# Patient Record
Sex: FEMALE | Race: OTHER | NOT HISPANIC OR LATINO | URBAN - METROPOLITAN AREA
[De-identification: names, ages, dates, MRNs, and addresses within clinical notes are randomized per-mention and may not be internally consistent; named-entity substitution may affect disease eponyms.]

---

## 2018-01-20 ENCOUNTER — EMERGENCY (EMERGENCY)
Facility: HOSPITAL | Age: 73
LOS: 1 days | Discharge: HOME | End: 2018-01-20
Attending: EMERGENCY MEDICINE
Payer: MEDICARE

## 2018-01-20 VITALS
WEIGHT: 134.04 LBS | TEMPERATURE: 98 F | HEART RATE: 60 BPM | HEIGHT: 59 IN | SYSTOLIC BLOOD PRESSURE: 184 MMHG | DIASTOLIC BLOOD PRESSURE: 79 MMHG | RESPIRATION RATE: 16 BRPM | OXYGEN SATURATION: 100 %

## 2018-01-20 LAB
ACETONE SERPL-MCNC: NEGATIVE — SIGNIFICANT CHANGE UP
ALBUMIN SERPL ELPH-MCNC: 4.1 G/DL — SIGNIFICANT CHANGE UP (ref 3.5–5)
ALP SERPL-CCNC: 54 U/L — SIGNIFICANT CHANGE UP (ref 40–120)
ALT FLD-CCNC: 27 U/L DA — SIGNIFICANT CHANGE UP (ref 10–60)
ANION GAP SERPL CALC-SCNC: 7 MMOL/L — SIGNIFICANT CHANGE UP (ref 5–17)
APPEARANCE UR: CLEAR — SIGNIFICANT CHANGE UP
APTT BLD: 28.9 SEC — SIGNIFICANT CHANGE UP (ref 27.5–37.4)
AST SERPL-CCNC: 22 U/L — SIGNIFICANT CHANGE UP (ref 10–40)
BACTERIA # UR AUTO: ABNORMAL /HPF
BASOPHILS # BLD AUTO: 0.1 K/UL — SIGNIFICANT CHANGE UP (ref 0–0.2)
BASOPHILS NFR BLD AUTO: 1.2 % — SIGNIFICANT CHANGE UP (ref 0–2)
BILIRUB SERPL-MCNC: 0.3 MG/DL — SIGNIFICANT CHANGE UP (ref 0.2–1.2)
BILIRUB UR-MCNC: NEGATIVE — SIGNIFICANT CHANGE UP
BUN SERPL-MCNC: 15 MG/DL — SIGNIFICANT CHANGE UP (ref 7–18)
CALCIUM SERPL-MCNC: 9 MG/DL — SIGNIFICANT CHANGE UP (ref 8.4–10.5)
CHLORIDE SERPL-SCNC: 102 MMOL/L — SIGNIFICANT CHANGE UP (ref 96–108)
CK MB BLD-MCNC: 1.1 % — SIGNIFICANT CHANGE UP (ref 0–3.5)
CK MB CFR SERPL CALC: 1.1 NG/ML — SIGNIFICANT CHANGE UP (ref 0–3.6)
CK SERPL-CCNC: 101 U/L — SIGNIFICANT CHANGE UP (ref 21–215)
CO2 SERPL-SCNC: 26 MMOL/L — SIGNIFICANT CHANGE UP (ref 22–31)
COLOR SPEC: YELLOW — SIGNIFICANT CHANGE UP
CREAT SERPL-MCNC: 0.58 MG/DL — SIGNIFICANT CHANGE UP (ref 0.5–1.3)
DIFF PNL FLD: ABNORMAL
EOSINOPHIL # BLD AUTO: 0.2 K/UL — SIGNIFICANT CHANGE UP (ref 0–0.5)
EOSINOPHIL NFR BLD AUTO: 2.9 % — SIGNIFICANT CHANGE UP (ref 0–6)
GLUCOSE SERPL-MCNC: 105 MG/DL — HIGH (ref 70–99)
GLUCOSE UR QL: NEGATIVE — SIGNIFICANT CHANGE UP
HCT VFR BLD CALC: 36.8 % — SIGNIFICANT CHANGE UP (ref 34.5–45)
HGB BLD-MCNC: 12.2 G/DL — SIGNIFICANT CHANGE UP (ref 11.5–15.5)
INR BLD: 0.96 RATIO — SIGNIFICANT CHANGE UP (ref 0.88–1.16)
KETONES UR-MCNC: NEGATIVE — SIGNIFICANT CHANGE UP
LEUKOCYTE ESTERASE UR-ACNC: ABNORMAL
LIDOCAIN IGE QN: 190 U/L — SIGNIFICANT CHANGE UP (ref 73–393)
LYMPHOCYTES # BLD AUTO: 3.2 K/UL — SIGNIFICANT CHANGE UP (ref 1–3.3)
LYMPHOCYTES # BLD AUTO: 36.8 % — SIGNIFICANT CHANGE UP (ref 13–44)
MAGNESIUM SERPL-MCNC: 1.9 MG/DL — SIGNIFICANT CHANGE UP (ref 1.6–2.6)
MCHC RBC-ENTMCNC: 30 PG — SIGNIFICANT CHANGE UP (ref 27–34)
MCHC RBC-ENTMCNC: 33.2 GM/DL — SIGNIFICANT CHANGE UP (ref 32–36)
MCV RBC AUTO: 90.5 FL — SIGNIFICANT CHANGE UP (ref 80–100)
MONOCYTES # BLD AUTO: 0.5 K/UL — SIGNIFICANT CHANGE UP (ref 0–0.9)
MONOCYTES NFR BLD AUTO: 6.1 % — SIGNIFICANT CHANGE UP (ref 2–14)
NEUTROPHILS # BLD AUTO: 4.6 K/UL — SIGNIFICANT CHANGE UP (ref 1.8–7.4)
NEUTROPHILS NFR BLD AUTO: 53.1 % — SIGNIFICANT CHANGE UP (ref 43–77)
NITRITE UR-MCNC: NEGATIVE — SIGNIFICANT CHANGE UP
NT-PROBNP SERPL-SCNC: 90 PG/ML — SIGNIFICANT CHANGE UP (ref 0–125)
PH UR: 7 — SIGNIFICANT CHANGE UP (ref 5–8)
PLATELET # BLD AUTO: 227 K/UL — SIGNIFICANT CHANGE UP (ref 150–400)
POTASSIUM SERPL-MCNC: 4.4 MMOL/L — SIGNIFICANT CHANGE UP (ref 3.5–5.3)
POTASSIUM SERPL-SCNC: 4.4 MMOL/L — SIGNIFICANT CHANGE UP (ref 3.5–5.3)
PROT SERPL-MCNC: 8 G/DL — SIGNIFICANT CHANGE UP (ref 6–8.3)
PROT UR-MCNC: NEGATIVE — SIGNIFICANT CHANGE UP
PROTHROM AB SERPL-ACNC: 10.4 SEC — SIGNIFICANT CHANGE UP (ref 9.8–12.7)
RBC # BLD: 4.07 M/UL — SIGNIFICANT CHANGE UP (ref 3.8–5.2)
RBC # FLD: 12 % — SIGNIFICANT CHANGE UP (ref 10.3–14.5)
RBC CASTS # UR COMP ASSIST: ABNORMAL /HPF (ref 0–2)
SODIUM SERPL-SCNC: 135 MMOL/L — SIGNIFICANT CHANGE UP (ref 135–145)
SP GR SPEC: 1.01 — SIGNIFICANT CHANGE UP (ref 1.01–1.02)
TROPONIN I SERPL-MCNC: <0.015 NG/ML — SIGNIFICANT CHANGE UP (ref 0–0.04)
UROBILINOGEN FLD QL: NEGATIVE — SIGNIFICANT CHANGE UP
WBC # BLD: 8.7 K/UL — SIGNIFICANT CHANGE UP (ref 3.8–10.5)
WBC # FLD AUTO: 8.7 K/UL — SIGNIFICANT CHANGE UP (ref 3.8–10.5)
WBC UR QL: SIGNIFICANT CHANGE UP /HPF (ref 0–5)

## 2018-01-20 PROCEDURE — 83880 ASSAY OF NATRIURETIC PEPTIDE: CPT

## 2018-01-20 PROCEDURE — 83735 ASSAY OF MAGNESIUM: CPT

## 2018-01-20 PROCEDURE — 93005 ELECTROCARDIOGRAM TRACING: CPT

## 2018-01-20 PROCEDURE — 84484 ASSAY OF TROPONIN QUANT: CPT

## 2018-01-20 PROCEDURE — 82553 CREATINE MB FRACTION: CPT

## 2018-01-20 PROCEDURE — 87086 URINE CULTURE/COLONY COUNT: CPT

## 2018-01-20 PROCEDURE — 82009 KETONE BODYS QUAL: CPT

## 2018-01-20 PROCEDURE — 83690 ASSAY OF LIPASE: CPT

## 2018-01-20 PROCEDURE — 99284 EMERGENCY DEPT VISIT MOD MDM: CPT | Mod: 25

## 2018-01-20 PROCEDURE — 85730 THROMBOPLASTIN TIME PARTIAL: CPT

## 2018-01-20 PROCEDURE — 82550 ASSAY OF CK (CPK): CPT

## 2018-01-20 PROCEDURE — 85027 COMPLETE CBC AUTOMATED: CPT

## 2018-01-20 PROCEDURE — 96374 THER/PROPH/DIAG INJ IV PUSH: CPT

## 2018-01-20 PROCEDURE — 80053 COMPREHEN METABOLIC PANEL: CPT

## 2018-01-20 PROCEDURE — 85610 PROTHROMBIN TIME: CPT

## 2018-01-20 PROCEDURE — 99285 EMERGENCY DEPT VISIT HI MDM: CPT

## 2018-01-20 PROCEDURE — 81001 URINALYSIS AUTO W/SCOPE: CPT

## 2018-01-20 RX ORDER — FAMOTIDINE 10 MG/ML
1 INJECTION INTRAVENOUS
Qty: 60 | Refills: 0
Start: 2018-01-20 | End: 2018-02-18

## 2018-01-20 RX ORDER — FAMOTIDINE 10 MG/ML
20 INJECTION INTRAVENOUS ONCE
Qty: 0 | Refills: 0 | Status: COMPLETED | OUTPATIENT
Start: 2018-01-20 | End: 2018-01-20

## 2018-01-20 RX ORDER — SODIUM CHLORIDE 9 MG/ML
3 INJECTION INTRAMUSCULAR; INTRAVENOUS; SUBCUTANEOUS ONCE
Qty: 0 | Refills: 0 | Status: COMPLETED | OUTPATIENT
Start: 2018-01-20 | End: 2018-01-20

## 2018-01-20 RX ORDER — SODIUM CHLORIDE 9 MG/ML
1000 INJECTION INTRAMUSCULAR; INTRAVENOUS; SUBCUTANEOUS
Qty: 0 | Refills: 0 | Status: DISCONTINUED | OUTPATIENT
Start: 2018-01-20 | End: 2018-01-24

## 2018-01-20 RX ADMIN — SODIUM CHLORIDE 125 MILLILITER(S): 9 INJECTION INTRAMUSCULAR; INTRAVENOUS; SUBCUTANEOUS at 20:13

## 2018-01-20 RX ADMIN — SODIUM CHLORIDE 3 MILLILITER(S): 9 INJECTION INTRAMUSCULAR; INTRAVENOUS; SUBCUTANEOUS at 19:59

## 2018-01-20 RX ADMIN — FAMOTIDINE 20 MILLIGRAM(S): 10 INJECTION INTRAVENOUS at 20:11

## 2018-01-20 NOTE — ED PROVIDER NOTE - OBJECTIVE STATEMENT
71 y/o F pt w/ PMHx of HTN, HLD, GERD and no PSHx, presents to ED c/o intermittent epigastric pain x today. Pt reports she bought her granddaughter in today for flu-like sx and was waiting, when she suddenly had epigastric pain. Pt reports the pain feels like a burning sensation w/ radiation across the sternal region. Pt reports she had baked spicy lamb earlier today. Pt reports relief w/ burping. Pt denies fever, chills, vomiting, diarrhea, or any other complaints. Pt states she had this in the past, but last had similar sx 8 years ago. Pt reports she last had an endoscopy 2 years ago, which was negative. NKDA.

## 2018-01-20 NOTE — ED ADULT NURSE NOTE - OBJECTIVE STATEMENT
Pt is aox3 came to er via private care c/o abdominal pain. pt was accompanied by her daughter and . iv placed labs sent.

## 2018-01-20 NOTE — ED PROVIDER NOTE - CARE PLAN
Principal Discharge DX:	Epigastric pain  Secondary Diagnosis:	GERD (gastroesophageal reflux disease)

## 2018-01-20 NOTE — ED PROVIDER NOTE - MEDICAL DECISION MAKING DETAILS
Pt w/ epigastric pain, most likely due to GERD and her diet. Will get labs, give meds, and reassess.

## 2018-01-21 LAB
CULTURE RESULTS: NO GROWTH — SIGNIFICANT CHANGE UP
SPECIMEN SOURCE: SIGNIFICANT CHANGE UP

## 2018-09-10 ENCOUNTER — EMERGENCY (EMERGENCY)
Facility: HOSPITAL | Age: 73
LOS: 1 days | Discharge: HOME | End: 2018-09-10
Attending: EMERGENCY MEDICINE
Payer: COMMERCIAL

## 2018-09-10 VITALS
WEIGHT: 136.03 LBS | RESPIRATION RATE: 19 BRPM | DIASTOLIC BLOOD PRESSURE: 77 MMHG | TEMPERATURE: 99 F | HEIGHT: 59 IN | HEART RATE: 54 BPM | OXYGEN SATURATION: 99 % | SYSTOLIC BLOOD PRESSURE: 212 MMHG

## 2018-09-10 LAB
ALBUMIN SERPL ELPH-MCNC: 4 G/DL — SIGNIFICANT CHANGE UP (ref 3.5–5)
ALP SERPL-CCNC: 63 U/L — SIGNIFICANT CHANGE UP (ref 40–120)
ALT FLD-CCNC: 29 U/L DA — SIGNIFICANT CHANGE UP (ref 10–60)
ANION GAP SERPL CALC-SCNC: 5 MMOL/L — SIGNIFICANT CHANGE UP (ref 5–17)
APTT BLD: 29.6 SEC — SIGNIFICANT CHANGE UP (ref 27.5–37.4)
AST SERPL-CCNC: 23 U/L — SIGNIFICANT CHANGE UP (ref 10–40)
BASOPHILS # BLD AUTO: 0.1 K/UL — SIGNIFICANT CHANGE UP (ref 0–0.2)
BASOPHILS NFR BLD AUTO: 1.2 % — SIGNIFICANT CHANGE UP (ref 0–2)
BILIRUB SERPL-MCNC: 0.3 MG/DL — SIGNIFICANT CHANGE UP (ref 0.2–1.2)
BUN SERPL-MCNC: 14 MG/DL — SIGNIFICANT CHANGE UP (ref 7–18)
CALCIUM SERPL-MCNC: 9.2 MG/DL — SIGNIFICANT CHANGE UP (ref 8.4–10.5)
CHLORIDE SERPL-SCNC: 101 MMOL/L — SIGNIFICANT CHANGE UP (ref 96–108)
CO2 SERPL-SCNC: 28 MMOL/L — SIGNIFICANT CHANGE UP (ref 22–31)
CREAT SERPL-MCNC: 0.64 MG/DL — SIGNIFICANT CHANGE UP (ref 0.5–1.3)
EOSINOPHIL # BLD AUTO: 0.2 K/UL — SIGNIFICANT CHANGE UP (ref 0–0.5)
EOSINOPHIL NFR BLD AUTO: 2.6 % — SIGNIFICANT CHANGE UP (ref 0–6)
GLUCOSE SERPL-MCNC: 124 MG/DL — HIGH (ref 70–99)
HCT VFR BLD CALC: 36.2 % — SIGNIFICANT CHANGE UP (ref 34.5–45)
HGB BLD-MCNC: 12.2 G/DL — SIGNIFICANT CHANGE UP (ref 11.5–15.5)
INR BLD: 0.96 RATIO — SIGNIFICANT CHANGE UP (ref 0.88–1.16)
LYMPHOCYTES # BLD AUTO: 3.2 K/UL — SIGNIFICANT CHANGE UP (ref 1–3.3)
LYMPHOCYTES # BLD AUTO: 39.6 % — SIGNIFICANT CHANGE UP (ref 13–44)
MCHC RBC-ENTMCNC: 29.1 PG — SIGNIFICANT CHANGE UP (ref 27–34)
MCHC RBC-ENTMCNC: 33.7 GM/DL — SIGNIFICANT CHANGE UP (ref 32–36)
MCV RBC AUTO: 86.2 FL — SIGNIFICANT CHANGE UP (ref 80–100)
MONOCYTES # BLD AUTO: 0.5 K/UL — SIGNIFICANT CHANGE UP (ref 0–0.9)
MONOCYTES NFR BLD AUTO: 6.4 % — SIGNIFICANT CHANGE UP (ref 2–14)
NEUTROPHILS # BLD AUTO: 4.1 K/UL — SIGNIFICANT CHANGE UP (ref 1.8–7.4)
NEUTROPHILS NFR BLD AUTO: 50.2 % — SIGNIFICANT CHANGE UP (ref 43–77)
PLATELET # BLD AUTO: 229 K/UL — SIGNIFICANT CHANGE UP (ref 150–400)
POTASSIUM SERPL-MCNC: 4.6 MMOL/L — SIGNIFICANT CHANGE UP (ref 3.5–5.3)
POTASSIUM SERPL-SCNC: 4.6 MMOL/L — SIGNIFICANT CHANGE UP (ref 3.5–5.3)
PROT SERPL-MCNC: 8 G/DL — SIGNIFICANT CHANGE UP (ref 6–8.3)
PROTHROM AB SERPL-ACNC: 10.4 SEC — SIGNIFICANT CHANGE UP (ref 9.8–12.7)
RBC # BLD: 4.19 M/UL — SIGNIFICANT CHANGE UP (ref 3.8–5.2)
RBC # FLD: 11.5 % — SIGNIFICANT CHANGE UP (ref 10.3–14.5)
SODIUM SERPL-SCNC: 134 MMOL/L — LOW (ref 135–145)
TROPONIN I SERPL-MCNC: <0.015 NG/ML — SIGNIFICANT CHANGE UP (ref 0–0.04)
WBC # BLD: 8.1 K/UL — SIGNIFICANT CHANGE UP (ref 3.8–10.5)
WBC # FLD AUTO: 8.1 K/UL — SIGNIFICANT CHANGE UP (ref 3.8–10.5)

## 2018-09-10 PROCEDURE — 99285 EMERGENCY DEPT VISIT HI MDM: CPT

## 2018-09-10 PROCEDURE — 71045 X-RAY EXAM CHEST 1 VIEW: CPT | Mod: 26

## 2018-09-10 PROCEDURE — 70450 CT HEAD/BRAIN W/O DYE: CPT | Mod: 26

## 2018-09-10 RX ORDER — ACETAMINOPHEN 500 MG
1000 TABLET ORAL ONCE
Qty: 0 | Refills: 0 | Status: COMPLETED | OUTPATIENT
Start: 2018-09-10 | End: 2018-09-10

## 2018-09-10 RX ORDER — HYDRALAZINE HCL 50 MG
10 TABLET ORAL ONCE
Qty: 0 | Refills: 0 | Status: COMPLETED | OUTPATIENT
Start: 2018-09-10 | End: 2018-09-10

## 2018-09-10 RX ORDER — SODIUM CHLORIDE 9 MG/ML
3 INJECTION INTRAMUSCULAR; INTRAVENOUS; SUBCUTANEOUS ONCE
Qty: 0 | Refills: 0 | Status: COMPLETED | OUTPATIENT
Start: 2018-09-10 | End: 2018-09-10

## 2018-09-10 RX ADMIN — SODIUM CHLORIDE 3 MILLILITER(S): 9 INJECTION INTRAMUSCULAR; INTRAVENOUS; SUBCUTANEOUS at 22:36

## 2018-09-10 RX ADMIN — Medication 10 MILLIGRAM(S): at 22:43

## 2018-09-10 RX ADMIN — Medication 400 MILLIGRAM(S): at 22:43

## 2018-09-10 NOTE — ED ADULT NURSE NOTE - NSIMPLEMENTINTERV_GEN_ALL_ED
Implemented All Fall with Harm Risk Interventions:  [place] to call system. Call bell, personal items and telephone within reach. Instruct patient to call for assistance. Room bathroom lighting operational. Non-slip footwear when patient is off stretcher. Physically safe environment: no spills, clutter or unnecessary equipment. Stretcher in lowest position, wheels locked, appropriate side rails in place. Provide visual cue, wrist band, yellow gown, etc. Monitor gait and stability. Monitor for mental status changes and reorient to person, place, and time. Review medications for side effects contributing to fall risk. Reinforce activity limits and safety measures with patient and family. Provide visual clues: red socks.

## 2018-09-10 NOTE — ED PROVIDER NOTE - OBJECTIVE STATEMENT
71 y/o F w/ PMHx of HTN, HLD, presents to ED c/o HTN and headache x 1100 today. Pt notes feeling headache to back of head in the morning, measured BP to be 220 systolically. Pt notes taking Propanolol 40mg at 0700 and then again at 1800, but w/ no relief of headache or BP, prompting ED visit today. Pt denies any blurry vision, weakness, numbness, chest pain, shortness of breath or any other complaints. Reports that she only takes Propanolol for HTN at this time. She was taken off Losartan a long time ago. Pt's last check up was 2 months ago at which point systolic BP was 160. Pt denies taking any headache medication today.

## 2018-09-10 NOTE — ED PROVIDER NOTE - MEDICAL DECISION MAKING DETAILS
71 y/o F w/ PMHx of HTN, here w/ HTN and headache. will obtain EKG, labs, CT-scan, will give Hydralazine for HTN and Tylenol for headache & re-assess.

## 2018-09-10 NOTE — ED PROVIDER NOTE - PROGRESS NOTE DETAILS
labs unremarkable, CThead unremarkable  discussed above with patient and family, on reeval family report patient had similar symptoms of headache 2 yrs ago, was hospitalized but no cause found, then as outpatient neurologist gave her neck injection for migraine which cured headaches. patient reports today's headache is similar to prior headache, now improved after migraine cocktail. On reexamination patient well-appearing. Patient stable for discharge to f/u with PMD/neurologist. given careful return precautions. patient requesting nasal spray for nasal congestion at this time.

## 2018-09-11 VITALS
RESPIRATION RATE: 18 BRPM | HEART RATE: 60 BPM | OXYGEN SATURATION: 99 % | DIASTOLIC BLOOD PRESSURE: 64 MMHG | TEMPERATURE: 98 F | SYSTOLIC BLOOD PRESSURE: 147 MMHG

## 2018-09-11 PROCEDURE — 85027 COMPLETE CBC AUTOMATED: CPT

## 2018-09-11 PROCEDURE — 85610 PROTHROMBIN TIME: CPT

## 2018-09-11 PROCEDURE — 85730 THROMBOPLASTIN TIME PARTIAL: CPT

## 2018-09-11 PROCEDURE — 96374 THER/PROPH/DIAG INJ IV PUSH: CPT

## 2018-09-11 PROCEDURE — 80053 COMPREHEN METABOLIC PANEL: CPT

## 2018-09-11 PROCEDURE — 93005 ELECTROCARDIOGRAM TRACING: CPT

## 2018-09-11 PROCEDURE — 84484 ASSAY OF TROPONIN QUANT: CPT

## 2018-09-11 PROCEDURE — 71045 X-RAY EXAM CHEST 1 VIEW: CPT

## 2018-09-11 PROCEDURE — 99284 EMERGENCY DEPT VISIT MOD MDM: CPT | Mod: 25

## 2018-09-11 PROCEDURE — 96375 TX/PRO/DX INJ NEW DRUG ADDON: CPT

## 2018-09-11 PROCEDURE — 70450 CT HEAD/BRAIN W/O DYE: CPT

## 2018-09-11 RX ORDER — METOCLOPRAMIDE HCL 10 MG
10 TABLET ORAL ONCE
Qty: 0 | Refills: 0 | Status: COMPLETED | OUTPATIENT
Start: 2018-09-11 | End: 2018-09-11

## 2018-09-11 RX ORDER — DIPHENHYDRAMINE HCL 50 MG
50 CAPSULE ORAL ONCE
Qty: 0 | Refills: 0 | Status: COMPLETED | OUTPATIENT
Start: 2018-09-11 | End: 2018-09-11

## 2018-09-11 RX ORDER — KETOROLAC TROMETHAMINE 30 MG/ML
15 SYRINGE (ML) INJECTION ONCE
Qty: 0 | Refills: 0 | Status: DISCONTINUED | OUTPATIENT
Start: 2018-09-11 | End: 2018-09-11

## 2018-09-11 RX ORDER — MAGNESIUM SULFATE 500 MG/ML
1 VIAL (ML) INJECTION ONCE
Qty: 0 | Refills: 0 | Status: COMPLETED | OUTPATIENT
Start: 2018-09-11 | End: 2018-09-11

## 2018-09-11 RX ORDER — SODIUM CHLORIDE 9 MG/ML
1000 INJECTION INTRAMUSCULAR; INTRAVENOUS; SUBCUTANEOUS ONCE
Qty: 0 | Refills: 0 | Status: COMPLETED | OUTPATIENT
Start: 2018-09-11 | End: 2018-09-11

## 2018-09-11 RX ORDER — FLUTICASONE PROPIONATE 50 MCG
1 SPRAY, SUSPENSION NASAL
Qty: 1 | Refills: 0
Start: 2018-09-11 | End: 2018-09-20

## 2018-09-11 RX ADMIN — Medication 100 GRAM(S): at 03:23

## 2018-09-11 RX ADMIN — Medication 10 MILLIGRAM(S): at 01:30

## 2018-09-11 RX ADMIN — Medication 15 MILLIGRAM(S): at 01:30

## 2018-09-11 RX ADMIN — Medication 50 MILLIGRAM(S): at 03:22

## 2018-09-11 RX ADMIN — SODIUM CHLORIDE 1000 MILLILITER(S): 9 INJECTION INTRAMUSCULAR; INTRAVENOUS; SUBCUTANEOUS at 01:30

## 2019-04-01 ENCOUNTER — OUTPATIENT (OUTPATIENT)
Dept: OUTPATIENT SERVICES | Facility: HOSPITAL | Age: 74
LOS: 1 days | End: 2019-04-01
Payer: MEDICARE

## 2019-04-01 PROCEDURE — G9001: CPT

## 2019-04-15 DIAGNOSIS — Z71.89 OTHER SPECIFIED COUNSELING: ICD-10-CM

## 2022-05-27 ENCOUNTER — EMERGENCY (EMERGENCY)
Facility: HOSPITAL | Age: 77
LOS: 1 days | Discharge: HOME | End: 2022-05-27
Attending: EMERGENCY MEDICINE
Payer: MEDICARE

## 2022-05-27 VITALS
DIASTOLIC BLOOD PRESSURE: 91 MMHG | SYSTOLIC BLOOD PRESSURE: 206 MMHG | OXYGEN SATURATION: 100 % | HEART RATE: 90 BPM | RESPIRATION RATE: 18 BRPM | WEIGHT: 140.65 LBS | HEIGHT: 59 IN | TEMPERATURE: 98 F

## 2022-05-27 VITALS
RESPIRATION RATE: 18 BRPM | OXYGEN SATURATION: 96 % | DIASTOLIC BLOOD PRESSURE: 84 MMHG | TEMPERATURE: 98 F | HEART RATE: 73 BPM | SYSTOLIC BLOOD PRESSURE: 176 MMHG

## 2022-05-27 PROCEDURE — 93005 ELECTROCARDIOGRAM TRACING: CPT

## 2022-05-27 PROCEDURE — 99284 EMERGENCY DEPT VISIT MOD MDM: CPT

## 2022-05-27 PROCEDURE — 99283 EMERGENCY DEPT VISIT LOW MDM: CPT

## 2022-05-27 RX ORDER — ACETAMINOPHEN 500 MG
975 TABLET ORAL ONCE
Refills: 0 | Status: COMPLETED | OUTPATIENT
Start: 2022-05-27 | End: 2022-05-27

## 2022-05-27 RX ORDER — AMLODIPINE BESYLATE 2.5 MG/1
10 TABLET ORAL ONCE
Refills: 0 | Status: COMPLETED | OUTPATIENT
Start: 2022-05-27 | End: 2022-05-27

## 2022-05-27 RX ORDER — DEXAMETHASONE 0.5 MG/5ML
12 ELIXIR ORAL ONCE
Refills: 0 | Status: COMPLETED | OUTPATIENT
Start: 2022-05-27 | End: 2022-05-27

## 2022-05-27 RX ADMIN — Medication 975 MILLIGRAM(S): at 20:52

## 2022-05-27 RX ADMIN — AMLODIPINE BESYLATE 10 MILLIGRAM(S): 2.5 TABLET ORAL at 20:52

## 2022-05-27 RX ADMIN — Medication 12 MILLIGRAM(S): at 20:52

## 2022-05-27 NOTE — ED PROVIDER NOTE - CLINICAL SUMMARY MEDICAL DECISION MAKING FREE TEXT BOX
Elderly F with ear pain s/p cerumen removal. No signs of infection. Will treat pain, BP meds, anticipate dc.

## 2022-05-27 NOTE — ED ADULT NURSE REASSESSMENT NOTE - NS ED NURSE REASSESS COMMENT FT1
covering rn: received pt.in bed  2200 pt.is alert and oriented x3.denies pain. re-check /84  aware with no new order made. re-eval by same MD with order to d/c home.left in the ed in stable condition.not in distress

## 2022-05-27 NOTE — ED PROVIDER NOTE - NS ED ATTENDING STATEMENT MOD
CHIEF COMPLAINT:   Chief Complaint   Patient presents with   • Ear Pain     right ear   • Throat Problem        HISTORY OF THE PRESENT ILLNESS:   Patient presents with 24 hour history of sore throat, right ear pain, and gland swelling in the neck on the right side.  She denies any exposure to COVID he has had a low-grade fever    Family History   Problem Relation Age of Onset   • Diabetes Mother    • Musculoskeletal Father         MS        Social History     Tobacco Use   • Smoking status: Former Smoker   • Smokeless tobacco: Never Used   Substance Use Topics   • Alcohol use: No   • Drug use: No       Past Surgical History:   Procedure Laterality Date   • Past surgical history  05/11/2009    None        History reviewed. No pertinent past medical history.    Allergies as of 12/14/2020   • (No Known Allergies)       Current Outpatient Medications   Medication   • azithromycin (Zithromax Z-Daniel) 250 MG tablet     No current facility-administered medications for this visit.        REVIEW OF SYSTEMS:  See HISTORY OF PRESENT ILLNESS.    PHYSICAL EXAMINATION:   GENERAL: The patient is a 21 year old female in no acute distress.   VITAL SIGNS: Blood pressure 107/67, pulse (!) 58, temperature 97.4 °F (36.3 °C), temperature source Temporal, resp. rate 14, height 5' 5\" (1.651 m), weight 79.3 kg, last menstrual period 12/12/2020, SpO2 100 %.    Exam HEENT, the left TM is clear, the right TM is red bulging and tender, examination of the throat reveals redness, swelling, and pustular exudate of the right tonsillar region, with tenderness enlarged submandibular glands on the right       1. Tonsillitis with exudate          Orders Placed This Encounter   • azithromycin (Zithromax Z-Daniel) 250 MG tablet       Return if symptoms worsen or fail to improve.      Jose F Abraham Jr, M.D.      Attending Only

## 2022-05-27 NOTE — ED PROVIDER NOTE - OBJECTIVE STATEMENT
75 yo F pmh of HTN presents with b/l ear pain, worse on L side, since getting cerumen removal at outside ED. Given abx ear drops. Pt has not taken any pain medicine. Take losartan for HTN and took it today, but tells me her BP is high b/c of ear pain. Denies other acute complaints.

## 2022-05-27 NOTE — ED PROVIDER NOTE - NS ED ROS FT
CONSTITUTIONAL: no fever  EYES: no eye pain   ENMT: no throat pain, +ear pain   CARDIOVASCULAR: no chest pain   RESPIRATORY: no shortness of breath   GASTROINTESTINAL: no abdominal pain   GENITOURINARY: no dysuria   SKIN: no rashes  NEUROLOGICAL: no headache

## 2022-05-27 NOTE — ED ADULT TRIAGE NOTE - CHIEF COMPLAINT QUOTE
I have high blood pressure, pain in left ear.  I had wax removal from both ear on 05/24/2022, since then I have pain in the left ear

## 2022-05-27 NOTE — ED PROVIDER NOTE - PHYSICAL EXAMINATION
GENERAL: well appearing, no acute distress   HEAD: atraumatic   EYES: EOMI   ENT: moist oral mucosa; R ear with dried blood presents in canal; L ear with cerumen present preventing view of TM   CARDIAC: regular rate  RESPIRATORY: no increased work of breathing   MUSCULOSKELETAL: no deformity   NEUROLOGICAL: alert, spontaneous movement of extremities   SKIN: no visible rash  PSYCHIATRIC: cooperative

## 2022-05-27 NOTE — ED PROVIDER NOTE - PATIENT PORTAL LINK FT
You can access the FollowMyHealth Patient Portal offered by [facility] by registering at the following website: [email or web address]. By joining [employer]’s FollowMyHealth portal, you will also be able to view your health information using other applications (apps) compatible with our system.

## 2022-06-12 ENCOUNTER — INPATIENT (INPATIENT)
Facility: HOSPITAL | Age: 77
LOS: 1 days | Discharge: LEFT BEFORE DISCHARGE | DRG: 641 | End: 2022-06-14
Attending: INTERNAL MEDICINE | Admitting: INTERNAL MEDICINE
Payer: MEDICARE

## 2022-06-12 VITALS
TEMPERATURE: 98 F | HEART RATE: 77 BPM | DIASTOLIC BLOOD PRESSURE: 73 MMHG | WEIGHT: 134.92 LBS | OXYGEN SATURATION: 97 % | HEIGHT: 59 IN | RESPIRATION RATE: 18 BRPM | SYSTOLIC BLOOD PRESSURE: 169 MMHG

## 2022-06-12 DIAGNOSIS — E78.5 HYPERLIPIDEMIA, UNSPECIFIED: ICD-10-CM

## 2022-06-12 DIAGNOSIS — E87.1 HYPO-OSMOLALITY AND HYPONATREMIA: ICD-10-CM

## 2022-06-12 DIAGNOSIS — I10 ESSENTIAL (PRIMARY) HYPERTENSION: ICD-10-CM

## 2022-06-12 DIAGNOSIS — Z29.9 ENCOUNTER FOR PROPHYLACTIC MEASURES, UNSPECIFIED: ICD-10-CM

## 2022-06-12 DIAGNOSIS — Z86.69 PERSONAL HISTORY OF OTHER DISEASES OF THE NERVOUS SYSTEM AND SENSE ORGANS: ICD-10-CM

## 2022-06-12 DIAGNOSIS — R51.9 HEADACHE, UNSPECIFIED: ICD-10-CM

## 2022-06-12 DIAGNOSIS — E11.9 TYPE 2 DIABETES MELLITUS WITHOUT COMPLICATIONS: ICD-10-CM

## 2022-06-12 LAB
ALBUMIN SERPL ELPH-MCNC: 3.5 G/DL — SIGNIFICANT CHANGE UP (ref 3.5–5)
ALP SERPL-CCNC: 62 U/L — SIGNIFICANT CHANGE UP (ref 40–120)
ALT FLD-CCNC: 32 U/L DA — SIGNIFICANT CHANGE UP (ref 10–60)
ANION GAP SERPL CALC-SCNC: 7 MMOL/L — SIGNIFICANT CHANGE UP (ref 5–17)
ANION GAP SERPL CALC-SCNC: 9 MMOL/L — SIGNIFICANT CHANGE UP (ref 5–17)
ANION GAP SERPL CALC-SCNC: 9 MMOL/L — SIGNIFICANT CHANGE UP (ref 5–17)
AST SERPL-CCNC: 19 U/L — SIGNIFICANT CHANGE UP (ref 10–40)
BASOPHILS # BLD AUTO: 0.04 K/UL — SIGNIFICANT CHANGE UP (ref 0–0.2)
BASOPHILS NFR BLD AUTO: 0.5 % — SIGNIFICANT CHANGE UP (ref 0–2)
BILIRUB SERPL-MCNC: 0.3 MG/DL — SIGNIFICANT CHANGE UP (ref 0.2–1.2)
BUN SERPL-MCNC: 10 MG/DL — SIGNIFICANT CHANGE UP (ref 7–18)
BUN SERPL-MCNC: 11 MG/DL — SIGNIFICANT CHANGE UP (ref 7–18)
BUN SERPL-MCNC: 9 MG/DL — SIGNIFICANT CHANGE UP (ref 7–18)
CALCIUM SERPL-MCNC: 9.1 MG/DL — SIGNIFICANT CHANGE UP (ref 8.4–10.5)
CALCIUM SERPL-MCNC: 9.3 MG/DL — SIGNIFICANT CHANGE UP (ref 8.4–10.5)
CALCIUM SERPL-MCNC: 9.6 MG/DL — SIGNIFICANT CHANGE UP (ref 8.4–10.5)
CHLORIDE SERPL-SCNC: 89 MMOL/L — LOW (ref 96–108)
CHLORIDE SERPL-SCNC: 92 MMOL/L — LOW (ref 96–108)
CHLORIDE SERPL-SCNC: 93 MMOL/L — LOW (ref 96–108)
CO2 SERPL-SCNC: 25 MMOL/L — SIGNIFICANT CHANGE UP (ref 22–31)
CO2 SERPL-SCNC: 26 MMOL/L — SIGNIFICANT CHANGE UP (ref 22–31)
CO2 SERPL-SCNC: 28 MMOL/L — SIGNIFICANT CHANGE UP (ref 22–31)
CREAT ?TM UR-MCNC: 18 MG/DL — SIGNIFICANT CHANGE UP
CREAT SERPL-MCNC: 0.57 MG/DL — SIGNIFICANT CHANGE UP (ref 0.5–1.3)
CREAT SERPL-MCNC: 0.67 MG/DL — SIGNIFICANT CHANGE UP (ref 0.5–1.3)
CREAT SERPL-MCNC: 0.7 MG/DL — SIGNIFICANT CHANGE UP (ref 0.5–1.3)
EGFR: 89 ML/MIN/1.73M2 — SIGNIFICANT CHANGE UP
EGFR: 90 ML/MIN/1.73M2 — SIGNIFICANT CHANGE UP
EGFR: 94 ML/MIN/1.73M2 — SIGNIFICANT CHANGE UP
EOSINOPHIL # BLD AUTO: 0.07 K/UL — SIGNIFICANT CHANGE UP (ref 0–0.5)
EOSINOPHIL NFR BLD AUTO: 0.9 % — SIGNIFICANT CHANGE UP (ref 0–6)
GLUCOSE BLDC GLUCOMTR-MCNC: 136 MG/DL — HIGH (ref 70–99)
GLUCOSE BLDC GLUCOMTR-MCNC: 151 MG/DL — HIGH (ref 70–99)
GLUCOSE SERPL-MCNC: 102 MG/DL — HIGH (ref 70–99)
GLUCOSE SERPL-MCNC: 113 MG/DL — HIGH (ref 70–99)
GLUCOSE SERPL-MCNC: 147 MG/DL — HIGH (ref 70–99)
HCT VFR BLD CALC: 31.6 % — LOW (ref 34.5–45)
HGB BLD-MCNC: 11.6 G/DL — SIGNIFICANT CHANGE UP (ref 11.5–15.5)
IMM GRANULOCYTES NFR BLD AUTO: 0.4 % — SIGNIFICANT CHANGE UP (ref 0–1.5)
LYMPHOCYTES # BLD AUTO: 1.86 K/UL — SIGNIFICANT CHANGE UP (ref 1–3.3)
LYMPHOCYTES # BLD AUTO: 22.8 % — SIGNIFICANT CHANGE UP (ref 13–44)
MAGNESIUM SERPL-MCNC: 1.8 MG/DL — SIGNIFICANT CHANGE UP (ref 1.6–2.6)
MCHC RBC-ENTMCNC: 29.7 PG — SIGNIFICANT CHANGE UP (ref 27–34)
MCHC RBC-ENTMCNC: 36.7 GM/DL — HIGH (ref 32–36)
MCV RBC AUTO: 81 FL — SIGNIFICANT CHANGE UP (ref 80–100)
MONOCYTES # BLD AUTO: 0.59 K/UL — SIGNIFICANT CHANGE UP (ref 0–0.9)
MONOCYTES NFR BLD AUTO: 7.2 % — SIGNIFICANT CHANGE UP (ref 2–14)
NEUTROPHILS # BLD AUTO: 5.58 K/UL — SIGNIFICANT CHANGE UP (ref 1.8–7.4)
NEUTROPHILS NFR BLD AUTO: 68.2 % — SIGNIFICANT CHANGE UP (ref 43–77)
NRBC # BLD: 0 /100 WBCS — SIGNIFICANT CHANGE UP (ref 0–0)
OSMOLALITY SERPL: 266 MOSMOL/KG — LOW (ref 280–301)
OSMOLALITY UR: 219 MOS/KG — SIGNIFICANT CHANGE UP (ref 50–1200)
PHOSPHATE SERPL-MCNC: 3.3 MG/DL — SIGNIFICANT CHANGE UP (ref 2.5–4.5)
PLATELET # BLD AUTO: 242 K/UL — SIGNIFICANT CHANGE UP (ref 150–400)
POTASSIUM SERPL-MCNC: 4.3 MMOL/L — SIGNIFICANT CHANGE UP (ref 3.5–5.3)
POTASSIUM SERPL-MCNC: 4.4 MMOL/L — SIGNIFICANT CHANGE UP (ref 3.5–5.3)
POTASSIUM SERPL-MCNC: 4.5 MMOL/L — SIGNIFICANT CHANGE UP (ref 3.5–5.3)
POTASSIUM SERPL-SCNC: 4.3 MMOL/L — SIGNIFICANT CHANGE UP (ref 3.5–5.3)
POTASSIUM SERPL-SCNC: 4.4 MMOL/L — SIGNIFICANT CHANGE UP (ref 3.5–5.3)
POTASSIUM SERPL-SCNC: 4.5 MMOL/L — SIGNIFICANT CHANGE UP (ref 3.5–5.3)
PROT SERPL-MCNC: 7.7 G/DL — SIGNIFICANT CHANGE UP (ref 6–8.3)
RBC # BLD: 3.9 M/UL — SIGNIFICANT CHANGE UP (ref 3.8–5.2)
RBC # FLD: 11.8 % — SIGNIFICANT CHANGE UP (ref 10.3–14.5)
SARS-COV-2 RNA SPEC QL NAA+PROBE: SIGNIFICANT CHANGE UP
SODIUM SERPL-SCNC: 124 MMOL/L — LOW (ref 135–145)
SODIUM SERPL-SCNC: 126 MMOL/L — LOW (ref 135–145)
SODIUM SERPL-SCNC: 128 MMOL/L — LOW (ref 135–145)
SODIUM UR-SCNC: 60 MMOL/L — SIGNIFICANT CHANGE UP
WBC # BLD: 8.17 K/UL — SIGNIFICANT CHANGE UP (ref 3.8–10.5)
WBC # FLD AUTO: 8.17 K/UL — SIGNIFICANT CHANGE UP (ref 3.8–10.5)

## 2022-06-12 PROCEDURE — 99285 EMERGENCY DEPT VISIT HI MDM: CPT

## 2022-06-12 PROCEDURE — 70450 CT HEAD/BRAIN W/O DYE: CPT | Mod: 26,MA

## 2022-06-12 RX ORDER — LOSARTAN POTASSIUM 100 MG/1
50 TABLET, FILM COATED ORAL DAILY
Refills: 0 | Status: DISCONTINUED | OUTPATIENT
Start: 2022-06-12 | End: 2022-06-12

## 2022-06-12 RX ORDER — INSULIN LISPRO 100/ML
VIAL (ML) SUBCUTANEOUS
Refills: 0 | Status: DISCONTINUED | OUTPATIENT
Start: 2022-06-12 | End: 2022-06-14

## 2022-06-12 RX ORDER — ONDANSETRON 8 MG/1
4 TABLET, FILM COATED ORAL EVERY 8 HOURS
Refills: 0 | Status: DISCONTINUED | OUTPATIENT
Start: 2022-06-12 | End: 2022-06-14

## 2022-06-12 RX ORDER — LOSARTAN POTASSIUM 100 MG/1
0 TABLET, FILM COATED ORAL
Qty: 0 | Refills: 1 | DISCHARGE

## 2022-06-12 RX ORDER — LANOLIN ALCOHOL/MO/W.PET/CERES
3 CREAM (GRAM) TOPICAL AT BEDTIME
Refills: 0 | Status: DISCONTINUED | OUTPATIENT
Start: 2022-06-12 | End: 2022-06-14

## 2022-06-12 RX ORDER — TIZANIDINE 4 MG/1
0 TABLET ORAL
Qty: 0 | Refills: 0 | DISCHARGE

## 2022-06-12 RX ORDER — ASPIRIN/CALCIUM CARB/MAGNESIUM 324 MG
81 TABLET ORAL DAILY
Refills: 0 | Status: DISCONTINUED | OUTPATIENT
Start: 2022-06-12 | End: 2022-06-14

## 2022-06-12 RX ORDER — METOCLOPRAMIDE HCL 10 MG
10 TABLET ORAL ONCE
Refills: 0 | Status: COMPLETED | OUTPATIENT
Start: 2022-06-12 | End: 2022-06-12

## 2022-06-12 RX ORDER — GABAPENTIN 400 MG/1
0 CAPSULE ORAL
Qty: 0 | Refills: 0 | DISCHARGE

## 2022-06-12 RX ORDER — ACETAMINOPHEN 500 MG
650 TABLET ORAL EVERY 6 HOURS
Refills: 0 | Status: DISCONTINUED | OUTPATIENT
Start: 2022-06-12 | End: 2022-06-14

## 2022-06-12 RX ORDER — LOSARTAN POTASSIUM 100 MG/1
50 TABLET, FILM COATED ORAL ONCE
Refills: 0 | Status: COMPLETED | OUTPATIENT
Start: 2022-06-12 | End: 2022-06-12

## 2022-06-12 RX ORDER — INSULIN LISPRO 100/ML
VIAL (ML) SUBCUTANEOUS AT BEDTIME
Refills: 0 | Status: DISCONTINUED | OUTPATIENT
Start: 2022-06-12 | End: 2022-06-14

## 2022-06-12 RX ORDER — ENOXAPARIN SODIUM 100 MG/ML
40 INJECTION SUBCUTANEOUS EVERY 24 HOURS
Refills: 0 | Status: DISCONTINUED | OUTPATIENT
Start: 2022-06-12 | End: 2022-06-14

## 2022-06-12 RX ORDER — ACETAMINOPHEN 500 MG
650 TABLET ORAL ONCE
Refills: 0 | Status: COMPLETED | OUTPATIENT
Start: 2022-06-12 | End: 2022-06-12

## 2022-06-12 RX ORDER — PROPRANOLOL HCL 160 MG
0 CAPSULE, EXTENDED RELEASE 24HR ORAL
Qty: 0 | Refills: 0 | DISCHARGE

## 2022-06-12 RX ORDER — POLYMYXIN B SULF/TRIMETHOPRIM 10000-1/ML
0 DROPS OPHTHALMIC (EYE)
Qty: 0 | Refills: 0 | DISCHARGE

## 2022-06-12 RX ORDER — MECLIZINE HCL 12.5 MG
0 TABLET ORAL
Qty: 0 | Refills: 0 | DISCHARGE

## 2022-06-12 RX ORDER — FREMANEZUMAB-VFRM 225 MG/1.5ML
0 INJECTION SUBCUTANEOUS
Qty: 0 | Refills: 0 | DISCHARGE

## 2022-06-12 RX ORDER — LOSARTAN POTASSIUM 100 MG/1
100 TABLET, FILM COATED ORAL DAILY
Refills: 0 | Status: DISCONTINUED | OUTPATIENT
Start: 2022-06-13 | End: 2022-06-14

## 2022-06-12 RX ORDER — SODIUM CHLORIDE 9 MG/ML
1000 INJECTION INTRAMUSCULAR; INTRAVENOUS; SUBCUTANEOUS ONCE
Refills: 0 | Status: COMPLETED | OUTPATIENT
Start: 2022-06-12 | End: 2022-06-12

## 2022-06-12 RX ORDER — ATORVASTATIN CALCIUM 80 MG/1
10 TABLET, FILM COATED ORAL AT BEDTIME
Refills: 0 | Status: DISCONTINUED | OUTPATIENT
Start: 2022-06-12 | End: 2022-06-14

## 2022-06-12 RX ADMIN — SODIUM CHLORIDE 1000 MILLILITER(S): 9 INJECTION INTRAMUSCULAR; INTRAVENOUS; SUBCUTANEOUS at 09:21

## 2022-06-12 RX ADMIN — LOSARTAN POTASSIUM 50 MILLIGRAM(S): 100 TABLET, FILM COATED ORAL at 16:48

## 2022-06-12 RX ADMIN — Medication 104 MILLIGRAM(S): at 09:20

## 2022-06-12 RX ADMIN — ATORVASTATIN CALCIUM 10 MILLIGRAM(S): 80 TABLET, FILM COATED ORAL at 21:41

## 2022-06-12 RX ADMIN — Medication 650 MILLIGRAM(S): at 09:20

## 2022-06-12 RX ADMIN — LOSARTAN POTASSIUM 50 MILLIGRAM(S): 100 TABLET, FILM COATED ORAL at 20:08

## 2022-06-12 RX ADMIN — Medication 650 MILLIGRAM(S): at 16:44

## 2022-06-12 NOTE — H&P ADULT - PROBLEM SELECTOR PLAN 2
- Patient has hx of migraine  - S/p 1L NS, Reglan and Tylenol in ED with improvement   - May start Benadryl 25 mg IV, Reglan 10 mg IV, Toradol 15 mg IV and Depacon 500 mg x1 IV  - Neuro consult - Patient has hx of migraine  - S/p 1L NS, Reglan and Tylenol in ED with improvement   - May start Benadryl 25 mg IV, Reglan 10 mg IV, Toradol 15 mg IV and Depacon 500 mg x1 IV  - Consider Neuro consult in the am

## 2022-06-12 NOTE — H&P ADULT - NSHPREVIEWOFSYSTEMS_GEN_ALL_CORE
Constitutional- no fever, chills, weight loss, weight gain or generalized weakness  Eyes – no vision loss or changes, no pain, no redness  ENT  – No hearing changes, no tinnitus, no discharge, no pain  - No runny nose, no congestion, no pain  - No sore throat, no hoarseness, no mouth sores, no voice change  CVS – No chest pain/no palpitations, or SOB  Respiratory - no cough, hemoptysis, wheezing, or SOB  GI – no dysphagia, heartburn, nausea, anorexia, emesis, diarrhea, abd pain, or change in bowel habits   – no frequency, urgency, hesitancy, nocturia, discharge, or weak stream  Skin – no rashes, lumps, or pruritus  SAMEER – no joint pain, stiffness, back pain, redness or swelling in joints  Psych – no confusion, depression , anxiety, or insomnia  Neuro – no dizziness, syncope, seizures, tremors, numbness, amnesia, or falls, headache +  Endocrine – no cold, heat intolerance, sweating, excessive hunger or thirst

## 2022-06-12 NOTE — H&P ADULT - NSICDXPASTMEDICALHX_GEN_ALL_CORE_FT
PAST MEDICAL HISTORY:  High cholesterol     Hypertension     Migraine      PAST MEDICAL HISTORY:  Diabetes mellitus     High cholesterol     Hypertension     Migraine

## 2022-06-12 NOTE — ED PROVIDER NOTE - PHYSICAL EXAMINATION
Afebrile, hemodynamically stable, saturating well  NAD, well appearing, sitting comfortably in chair, no WOB, speaking full sentences  Head NCAT, neck supple/full ROM  PERRL, EOMI, anicteric  MMM, R TM cerumen-impacted, no canal erythema or mastoid tenderness  No JVD  RRR, nml S1/S2, no m/r/g  Lungs CTAB, no w/r/r  Abd soft, NT, ND, nml BS, no rebound or guarding  AAO, CN's 3-12 intact, motor 5/5 in all extrems, nml independent gait  MENDIOLA spontaneously, no leg cyanosis or edema  Skin warm, well perfused, no rashes or hives

## 2022-06-12 NOTE — H&P ADULT - NSHPPHYSICALEXAM_GEN_ALL_CORE
ICU Vital Signs Last 24 Hrs  T(C): 36.6 (12 Jun 2022 11:08), Max: 36.7 (12 Jun 2022 07:10)  T(F): 97.9 (12 Jun 2022 11:08), Max: 98 (12 Jun 2022 07:10)  HR: 61 (12 Jun 2022 11:08) (61 - 77)  BP: 171/78 (12 Jun 2022 11:08) (169/73 - 171/78)  RR: 18 (12 Jun 2022 11:08) (18 - 18)  SpO2: 100% (12 Jun 2022 11:08) (97% - 100%)    GENERAL: NAD, average weight, sat well on RA  HEAD:  Atraumatic, Normocephalic  EYES:  conjunctiva and sclera clear  NECK: Supple, No JVD, Normal thyroid  CHEST/LUNG: Clear to auscultation. Clear to percussion bilaterally; No rales, rhonchi, wheezing, or rubs  HEART: Regular rate and rhythm; No murmurs, rubs, or gallops  ABDOMEN: Soft, Nontender, Nondistended; Bowel sounds present, no pain or masses on palpation   NERVOUS SYSTEM:  Alert & Oriented X3, no neuro deficits   EXTREMITIES:  2+ Peripheral Pulses, No clubbing, cyanosis, or edema  : voiding well   SKIN: warm, dry

## 2022-06-12 NOTE — PATIENT PROFILE ADULT - FALL HARM RISK - HARM RISK INTERVENTIONS

## 2022-06-12 NOTE — H&P ADULT - HISTORY OF PRESENT ILLNESS
76 year old female from home, independent, has past medical hx of hypertension, HLD, migraines, dizziness came to ED c/o worsening migraine in the last 2 days but has had headache for about 3 weeks since coming back from [place]. Patient denies any dizziness, lightheadedness, chest pain, sob, cough, fever, focal weakness, sick contacts. Of note, patient went to her PCP for migraine who prescribed Butalb- acetaminophen -caffeine and pregabalin which did not help.   In ED s/p 1L NS, Reglan and Tylenol with some improvement  76 year old female from home, independent, has past medical hx of hypertension, HLD, migraines, DM on glimepiride, dizziness came to ED c/o worsening migraine in the last 2 days but has had headache for about 3 weeks since coming back from her [place] country. Patient denies any dizziness, lightheadedness, chest pain, sob, cough, fever, focal weakness, sick contacts. Of note, patient went to her PCP for migraine who prescribed Butalb- acetaminophen -caffeine and pregabalin which did not help.   In ED s/p 1L NS, Reglan and Tylenol with some improvement

## 2022-06-12 NOTE — ED PROVIDER NOTE - OBJECTIVE STATEMENT
76yoF with h/o HTN, HLD, migraines, presents with HA. Reports bitemporal/parietal HA symmetrically progressing around b/l ears, and sometimes wakes up with some posterior neck pain. Sometimes has a few seconds of b/l vision blurring as if dizzy but no actual dizziness. States onset 1 mth ago after going to sleep with debrox in her R ear. Follows with neurologist regularly and prescribed pregabalin and fioricet for HA's, which improve but do not resolve HA. Denies all other symptoms including fever, vomiting, SOB, cough, trauma.

## 2022-06-12 NOTE — H&P ADULT - ASSESSMENT
76 year old female from home, independent, has past medical hx of hypertension, HLD, migraines, dizziness is admitted to medicine for hyponatremia  76 year old female from home, independent, has past medical hx of hypertension, HLD, migraines, DM, dizziness is admitted to medicine for hyponatremia

## 2022-06-12 NOTE — H&P ADULT - PROBLEM SELECTOR PLAN 4
- Patient has hx of DM on Glimepiride at home  - Holding home po medications  - Started on HSS  - Fingerstick before meals and at bedtime  - DASH diet with consistent carb  - Target -180  - F/u A1c

## 2022-06-12 NOTE — H&P ADULT - PROBLEM SELECTOR PLAN 3
- Patient has history of Hypertension on losartan HCTZ at home   - C/w home meds with parameters  - Hold HCTZ for now due to hyponatremia   - DASH diet  - Monitor BP and adjust meds as needed

## 2022-06-12 NOTE — ED PROVIDER NOTE - NSFOLLOWUPINSTRUCTIONS_ED_ALL_ED_FT
Please follow up with your primary care doctor, neurologist, and ENT doctor in 1-2 days.  Please use ibuprofen or acetaminophen as needed for pain.  Please keep well hydrated.  Please return to the emergency department if you have worsening pain, dizziness, vomiting, fever, or any other symptoms.    Acute Headache    WHAT YOU NEED TO KNOW:    An acute headache is pain or discomfort that starts suddenly and gets worse quickly. You may have an acute headache only when you feel stress or eat certain foods. Other acute headache pain can happen every day, and sometimes several times a day.     DISCHARGE INSTRUCTIONS:    Return to the emergency department if:   •You have severe pain.  •You have numbness or weakness on one side of your face or body.  •You have a headache that occurs after a blow to the head, a fall, or other trauma.   •You have a headache, are forgetful or confused, or have trouble speaking.  •You have a headache, stiff neck, and a fever.    Contact your healthcare provider if:   •You have a constant headache and are vomiting.  •You have a headache each day that does not get better, even after treatment.  •You have changes in your headaches, or new symptoms that occur when you have a headache.  •You have questions or concerns about your condition or care.    Medicines: You may need any of the following:   •Prescription pain medicine may be given. The medicine your healthcare provider recommends will depend on the kind of headaches you have. You will need to take prescription headache medicines as directed to prevent a problem called rebound headache. These headaches happen with regular use of pain relievers for headache disorders.  •NSAIDs, such as ibuprofen, help decrease swelling, pain, and fever. This medicine is available with or without a doctor's order. NSAIDs can cause stomach bleeding or kidney problems in certain people. If you take blood thinner medicine, always ask your healthcare provider if NSAIDs are safe for you. Always read the medicine label and follow directions.  •Acetaminophen decreases pain and fever. It is available without a doctor's order. Ask how much to take and how often to take it. Follow directions. Read the labels of all other medicines you are using to see if they also contain acetaminophen, or ask your doctor or pharmacist. Acetaminophen can cause liver damage if not taken correctly. Do not use more than 3 grams (3,000 milligrams) total of acetaminophen in one day.   •Antidepressants may be given for some kinds of headaches.   •Take your medicine as directed. Contact your healthcare provider if you think your medicine is not helping or if you have side effects. Tell him or her if you are allergic to any medicine. Keep a list of the medicines, vitamins, and herbs you take. Include the amounts, and when and why you take them. Bring the list or the pill bottles to follow-up visits. Carry your medicine list with you in case of an emergency.    Manage your symptoms:   •Apply heat or ice on the headache area. Use a heat or ice pack. For an ice pack, you can also put crushed ice in a plastic bag. Cover the pack or bag with a towel before you apply it to your skin. Ice and heat both help decrease pain, and heat also helps decrease muscle spasms. Apply heat for 20 to 30 minutes every 2 hours. Apply ice for 15 to 20 minutes every hour. Apply heat or ice for as long and for as many days as directed. You may alternate heat and ice.  •Relax your muscles. Lie down in a comfortable position and close your eyes. Relax your muscles slowly. Start at your toes and work your way up your body.  •Keep a record of your headaches. Write down when your headaches start and stop. Include your symptoms and what you were doing when the headache began. Record what you ate or drank for 24 hours before the headache started. Describe the pain and where it hurts. Keep track of what you did to treat your headache and if it worked.     Prevent an acute headache:   •Avoid anything that triggers an acute headache. Examples include exposure to chemicals, going to high altitude, or not getting enough sleep. Create a regular sleep routine. Go to sleep at the same time and wake up at the same time each day. Do not use electronic devices before bedtime. These may trigger a headache or prevent you from sleeping well.  •Do not smoke. Nicotine and other chemicals in cigarettes and cigars can trigger an acute headache or make it worse. Ask your healthcare provider for information if you currently smoke and need help to quit. E-cigarettes or smokeless tobacco still contain nicotine. Talk to your healthcare provider before you use these products.   •Limit alcohol as directed. Alcohol can trigger an acute headache or make it worse. If you have cluster headaches, do not drink alcohol during an episode. For other types of headaches, ask your healthcare provider if it is safe for you to drink alcohol. Ask how much is safe for you to drink, and how often.  •Exercise as directed. Exercise can reduce tension and help with headache pain. Aim for 30 minutes of physical activity on most days of the week. Your healthcare provider can help you create an exercise plan.  •Eat a variety of healthy foods. Healthy foods include fruits, vegetables, low-fat dairy products, lean meats, fish, whole grains, and cooked beans. Your healthcare provider or dietitian can help you create meals plans if you need to avoid foods that trigger headaches.    Follow up with your healthcare provider as directed: Bring your headache record with you when you see your healthcare provider. Write down your questions so you remember to ask them during your visits.

## 2022-06-12 NOTE — ED PROVIDER NOTE - CARE PLAN
Principal Discharge DX:	Headache   1 Principal Discharge DX:	Headache  Secondary Diagnosis:	Acute hyponatremia

## 2022-06-12 NOTE — ED PROVIDER NOTE - CLINICAL SUMMARY MEDICAL DECISION MAKING FREE TEXT BOX
No e/o glaucoma or temporal arteritis. No fever or meningeal signs. Character low suspicion for dissection or CVA and no focal or localizing symptoms. No e/o OM, mastoiditis. Pt with long h/o migraines and this is in similar distribution to that or prior ones. Has neuro f/u. No e/o glaucoma or temporal arteritis. No fever or meningeal signs. Character low suspicion for dissection or CVA and no focal or localizing symptoms. No e/o OM, mastoiditis. Noted hypoNa which could be contributing to symptoms. Given fluids, Tyl, Reglan with significant improvement. Patient well appearing, hemodynamically stable. Admitted to internal medicine for further monitoring, w/u, and care. Dr. Dowling requests admission to [facility] with whom he will communicate.

## 2022-06-12 NOTE — H&P ADULT - PROBLEM SELECTOR PLAN 1
- Patient presented with headache - migraine, refers taking less salt and on HCTZ at home   - Moderate hyponatremia most likely multifactorial from HCTZ, low salt and acute pain   - Goal is to increase 4-6 mEq in 24 hrs, avoid overcorrection  - Possible SIADH   - F/u serum osmolarity, urine osmolarity, urine Cr, Urine lytes, TSH  - Monitor BMP q6 hrs   - Monitor intake and output   - Seizure and aspiration precautions  - Consider Nephro consult for further recommendations - Patient presented with headache - migraine, refers taking less salt and on HCTZ at home   - Moderate hyponatremia most likely multifactorial from HCTZ, low salt and acute pain   - No change in mental status or seizures   - Goal is to increase 4-6 mEq in 24 hrs, avoid overcorrection  - Possible SIADH from pain & HCTZ  - F/u serum osmolarity, urine osmolarity, urine Cr, Urine lytes, TSH  - Monitor BMP q8 hrs   - Monitor intake and output   - Hold HCTZ, pain management   - Seizure and aspiration precautions  - Consider Nephro consult for further recommendations - Patient presented with headache - migraine, refers taking less salt and on HCTZ at home   - Moderate hyponatremia most likely multifactorial from HCTZ, low salt and acute pain   - No change in mental status or seizures, CTH no acute changes   - Goal is to increase 4-6 mEq in 24 hrs, avoid overcorrection  - Possible SIADH from pain & HCTZ  - F/u serum osmolarity, urine osmolarity, urine Cr, Urine lytes, TSH  - Monitor BMP q8 hrs   - Monitor intake and output   - Hold HCTZ, pain management   - Seizure and aspiration precautions  - Consider Nephro consult for further recommendations

## 2022-06-12 NOTE — ED ADULT NURSE NOTE - OBJECTIVE STATEMENT
pt is here for headache.  pt stated that headache x one month, denied chest pain or sob, denied fever or chills,

## 2022-06-13 DIAGNOSIS — M62.838 OTHER MUSCLE SPASM: ICD-10-CM

## 2022-06-13 DIAGNOSIS — Z02.9 ENCOUNTER FOR ADMINISTRATIVE EXAMINATIONS, UNSPECIFIED: ICD-10-CM

## 2022-06-13 DIAGNOSIS — I16.0 HYPERTENSIVE URGENCY: ICD-10-CM

## 2022-06-13 LAB
A1C WITH ESTIMATED AVERAGE GLUCOSE RESULT: 6.4 % — HIGH (ref 4–5.6)
ALBUMIN SERPL ELPH-MCNC: 3.2 G/DL — LOW (ref 3.5–5)
ALP SERPL-CCNC: 55 U/L — SIGNIFICANT CHANGE UP (ref 40–120)
ALT FLD-CCNC: 29 U/L DA — SIGNIFICANT CHANGE UP (ref 10–60)
ANION GAP SERPL CALC-SCNC: 9 MMOL/L — SIGNIFICANT CHANGE UP (ref 5–17)
AST SERPL-CCNC: 19 U/L — SIGNIFICANT CHANGE UP (ref 10–40)
BILIRUB SERPL-MCNC: 0.4 MG/DL — SIGNIFICANT CHANGE UP (ref 0.2–1.2)
BUN SERPL-MCNC: 9 MG/DL — SIGNIFICANT CHANGE UP (ref 7–18)
CALCIUM SERPL-MCNC: 9.4 MG/DL — SIGNIFICANT CHANGE UP (ref 8.4–10.5)
CHLORIDE SERPL-SCNC: 94 MMOL/L — LOW (ref 96–108)
CO2 SERPL-SCNC: 26 MMOL/L — SIGNIFICANT CHANGE UP (ref 22–31)
CREAT SERPL-MCNC: 0.69 MG/DL — SIGNIFICANT CHANGE UP (ref 0.5–1.3)
EGFR: 89 ML/MIN/1.73M2 — SIGNIFICANT CHANGE UP
ESTIMATED AVERAGE GLUCOSE: 137 MG/DL — HIGH (ref 68–114)
GLUCOSE BLDC GLUCOMTR-MCNC: 115 MG/DL — HIGH (ref 70–99)
GLUCOSE BLDC GLUCOMTR-MCNC: 119 MG/DL — HIGH (ref 70–99)
GLUCOSE BLDC GLUCOMTR-MCNC: 154 MG/DL — HIGH (ref 70–99)
GLUCOSE BLDC GLUCOMTR-MCNC: 175 MG/DL — HIGH (ref 70–99)
GLUCOSE SERPL-MCNC: 135 MG/DL — HIGH (ref 70–99)
HCT VFR BLD CALC: 30.3 % — LOW (ref 34.5–45)
HGB BLD-MCNC: 11.1 G/DL — LOW (ref 11.5–15.5)
MAGNESIUM SERPL-MCNC: 2 MG/DL — SIGNIFICANT CHANGE UP (ref 1.6–2.6)
MCHC RBC-ENTMCNC: 29.8 PG — SIGNIFICANT CHANGE UP (ref 27–34)
MCHC RBC-ENTMCNC: 36.6 GM/DL — HIGH (ref 32–36)
MCV RBC AUTO: 81.2 FL — SIGNIFICANT CHANGE UP (ref 80–100)
NRBC # BLD: 0 /100 WBCS — SIGNIFICANT CHANGE UP (ref 0–0)
PHOSPHATE SERPL-MCNC: 3.1 MG/DL — SIGNIFICANT CHANGE UP (ref 2.5–4.5)
PLATELET # BLD AUTO: 241 K/UL — SIGNIFICANT CHANGE UP (ref 150–400)
POTASSIUM SERPL-MCNC: 4.4 MMOL/L — SIGNIFICANT CHANGE UP (ref 3.5–5.3)
POTASSIUM SERPL-SCNC: 4.4 MMOL/L — SIGNIFICANT CHANGE UP (ref 3.5–5.3)
PROT SERPL-MCNC: 7 G/DL — SIGNIFICANT CHANGE UP (ref 6–8.3)
RBC # BLD: 3.73 M/UL — LOW (ref 3.8–5.2)
RBC # FLD: 11.8 % — SIGNIFICANT CHANGE UP (ref 10.3–14.5)
SODIUM SERPL-SCNC: 129 MMOL/L — LOW (ref 135–145)
TSH SERPL-MCNC: 1.5 UU/ML — SIGNIFICANT CHANGE UP (ref 0.34–4.82)
UUN UR-MCNC: 143 MG/DL — SIGNIFICANT CHANGE UP
WBC # BLD: 7.56 K/UL — SIGNIFICANT CHANGE UP (ref 3.8–10.5)
WBC # FLD AUTO: 7.56 K/UL — SIGNIFICANT CHANGE UP (ref 3.8–10.5)

## 2022-06-13 PROCEDURE — 99223 1ST HOSP IP/OBS HIGH 75: CPT

## 2022-06-13 RX ORDER — HYDRALAZINE HCL 50 MG
10 TABLET ORAL THREE TIMES A DAY
Refills: 0 | Status: DISCONTINUED | OUTPATIENT
Start: 2022-06-13 | End: 2022-06-13

## 2022-06-13 RX ORDER — HYDRALAZINE HCL 50 MG
10 TABLET ORAL ONCE
Refills: 0 | Status: COMPLETED | OUTPATIENT
Start: 2022-06-13 | End: 2022-06-13

## 2022-06-13 RX ORDER — CYCLOBENZAPRINE HYDROCHLORIDE 10 MG/1
5 TABLET, FILM COATED ORAL THREE TIMES A DAY
Refills: 0 | Status: DISCONTINUED | OUTPATIENT
Start: 2022-06-13 | End: 2022-06-14

## 2022-06-13 RX ORDER — HYDRALAZINE HCL 50 MG
5 TABLET ORAL ONCE
Refills: 0 | Status: COMPLETED | OUTPATIENT
Start: 2022-06-13 | End: 2022-06-13

## 2022-06-13 RX ORDER — METOCLOPRAMIDE HCL 10 MG
10 TABLET ORAL ONCE
Refills: 0 | Status: COMPLETED | OUTPATIENT
Start: 2022-06-13 | End: 2022-06-13

## 2022-06-13 RX ORDER — HYDRALAZINE HCL 50 MG
25 TABLET ORAL THREE TIMES A DAY
Refills: 0 | Status: DISCONTINUED | OUTPATIENT
Start: 2022-06-13 | End: 2022-06-14

## 2022-06-13 RX ORDER — KETOROLAC TROMETHAMINE 30 MG/ML
15 SYRINGE (ML) INJECTION ONCE
Refills: 0 | Status: DISCONTINUED | OUTPATIENT
Start: 2022-06-13 | End: 2022-06-13

## 2022-06-13 RX ORDER — VALPROIC ACID (AS SODIUM SALT) 250 MG/5ML
500 SOLUTION, ORAL ORAL ONCE
Refills: 0 | Status: COMPLETED | OUTPATIENT
Start: 2022-06-13 | End: 2022-06-13

## 2022-06-13 RX ADMIN — LOSARTAN POTASSIUM 100 MILLIGRAM(S): 100 TABLET, FILM COATED ORAL at 05:52

## 2022-06-13 RX ADMIN — Medication 50 MILLIGRAM(S): at 21:38

## 2022-06-13 RX ADMIN — ENOXAPARIN SODIUM 40 MILLIGRAM(S): 100 INJECTION SUBCUTANEOUS at 05:52

## 2022-06-13 RX ADMIN — Medication 10 MILLIGRAM(S): at 21:38

## 2022-06-13 RX ADMIN — Medication 1: at 07:51

## 2022-06-13 RX ADMIN — Medication 81 MILLIGRAM(S): at 11:22

## 2022-06-13 RX ADMIN — Medication 25 MILLIGRAM(S): at 12:25

## 2022-06-13 RX ADMIN — Medication 10 MILLIGRAM(S): at 13:55

## 2022-06-13 RX ADMIN — Medication 5 MILLIGRAM(S): at 18:41

## 2022-06-13 NOTE — PROGRESS NOTE ADULT - PROBLEM SELECTOR PLAN 6
IMPROVE VTE Individual Risk Assessment  RISK                                                                Points  [  ] Previous VTE                                                  3  [  ] Thrombophilia                                               2  [  ] Lower limb paralysis                                      2        (unable to hold up >15 seconds)    [  ] Current Cancer                                              2         (within 6 months)  [  ] Immobilization > 24 hrs                                1  [  ] ICU/CCU stay > 24 hours                              1  [  ] Age > 60                                                      1  IMPROVE VTE Score _________    PPI for GI prophylaxis  Lovenox for DVT PPX PPI for GI prophylaxis  Lovenox for DVT PPX

## 2022-06-13 NOTE — PROGRESS NOTE ADULT - PROBLEM SELECTOR PLAN 7
-pending sodium correction and BP control   -will d/c to home  -spoke with daughter, aware of plan of care.
-pending sodium correction and BP control   -will d/c to home  -spoke with daughter, aware of plan of care.

## 2022-06-13 NOTE — PROGRESS NOTE ADULT - PROBLEM SELECTOR PLAN 3
- Patient has history of Hypertension on losartan and HCTZ at home   -uncontrolled   - losartan continued  - Hold HCTZ for now due to hyponatremia   - [id number] IV x 1 now and then start 25mg PO TID   - Monitor BP and adjust meds as needed

## 2022-06-13 NOTE — PROGRESS NOTE ADULT - PROBLEM SELECTOR PLAN 4
- Patient has hx of DM on Glimepiride at home  - Holding home po medications  -controlled   -A1C: 6.4   - Started on ISS   - DASH diet with consistent carb  - Target -180

## 2022-06-13 NOTE — PROGRESS NOTE ADULT - PROBLEM SELECTOR PLAN 5
- Patient has hx of hyperlipidemia on Statin at home   - C/w Statin - hx of hyperlipidemia on Statin at home   - C/w Statin

## 2022-06-13 NOTE — PROGRESS NOTE ADULT - SUBJECTIVE AND OBJECTIVE BOX
Patient is a 76y old  Female who presents with a chief complaint of HYPONATREMIA (12 Jun 2022 14:24)    INTERVAL HPI/OVERNIGHT EVENTS: no new complaints    MEDICATIONS  (STANDING):  aspirin enteric coated 81 milliGRAM(s) Oral daily  atorvastatin 10 milliGRAM(s) Oral at bedtime  enoxaparin Injectable 40 milliGRAM(s) SubCutaneous every 24 hours  hydrALAZINE 10 milliGRAM(s) Oral three times a day  insulin lispro (ADMELOG) corrective regimen sliding scale   SubCutaneous three times a day before meals  insulin lispro (ADMELOG) corrective regimen sliding scale   SubCutaneous at bedtime  losartan 100 milliGRAM(s) Oral daily  pregabalin 25 milliGRAM(s) Oral daily  pregabalin 50 milliGRAM(s) Oral at bedtime    MEDICATIONS  (PRN):  acetaminophen     Tablet .. 650 milliGRAM(s) Oral every 6 hours PRN Temp greater or equal to 38C (100.4F), Mild Pain (1 - 3)  melatonin 3 milliGRAM(s) Oral at bedtime PRN Insomnia  ondansetron Injectable 4 milliGRAM(s) IV Push every 8 hours PRN Nausea and/or Vomiting      __________________________________________________  REVIEW OF SYSTEMS:    CONSTITUTIONAL: No fever,   EYES: no acute visual disturbances  NECK: +posterior neck pain   RESPIRATORY: No cough; No shortness of breath  CARDIOVASCULAR: No chest pain, no palpitations  GASTROINTESTINAL: No pain. No nausea or vomiting; No diarrhea   NEUROLOGICAL: +persistent posterior pressure like headaches with band-like pain behind bilateral ears.   MUSCULOSKELETAL: No joint pain, no muscle pain  GENITOURINARY: no dysuria, no frequency, no hesitancy  PSYCHIATRY: no depression , no anxiety  ALL OTHER  ROS negative        Vital Signs Last 24 Hrs  T(C): 36.2 (13 Jun 2022 05:00), Max: 36.7 (12 Jun 2022 15:50)  T(F): 97.2 (13 Jun 2022 05:00), Max: 98 (12 Jun 2022 15:50)  HR: 82 (13 Jun 2022 11:11) (62 - 82)  BP: 175/78 (13 Jun 2022 11:11) (168/65 - 191/70)  BP(mean): --  RR: 18 (13 Jun 2022 08:30) (17 - 18)  SpO2: 97% (13 Jun 2022 05:00) (97% - 100%)    ________________________________________________  PHYSICAL EXAM:  GENERAL: NAD  HEENT: Normocephalic;  conjunctivae and sclerae clear; moist mucous membranes;   NECK : supple  CHEST/LUNG: Clear to auscultation bilaterally with good air entry   HEART: S1 S2  regular; no murmurs, gallops or rubs  ABDOMEN: Soft, Nontender, Nondistended; Bowel sounds present  EXTREMITIES: no cyanosis; no edema; no calf tenderness  SKIN: warm and dry; no rash  NERVOUS SYSTEM:  Awake and alert; Oriented  to place, person and time ;_________________________________________________  LABS:                        11.1   7.56  )-----------( 241      ( 13 Jun 2022 07:04 )             30.3     06-13    129<L>  |  94<L>  |  9   ----------------------------<  135<H>  4.4   |  26  |  0.69    Ca    9.4      13 Jun 2022 07:04  Phos  3.1     06-13  Mg     2.0     06-13    TPro  7.0  /  Alb  3.2<L>  /  TBili  0.4  /  DBili  x   /  AST  19  /  ALT  29  /  AlkPhos  55  06-13        CAPILLARY BLOOD GLUCOSE      POCT Blood Glucose.: 115 mg/dL (13 Jun 2022 12:08)  POCT Blood Glucose.: 154 mg/dL (13 Jun 2022 07:37)  POCT Blood Glucose.: 151 mg/dL (12 Jun 2022 21:13)  POCT Blood Glucose.: 136 mg/dL (12 Jun 2022 18:55)    RADIOLOGY & ADDITIONAL TESTS:    < from: CT Head No Cont (06.12.22 @ 08:24) >    ACC: [id number] EXAM:  CT BRAIN                          PROCEDURE DATE:  06/12/2022          INTERPRETATION:  CLINICAL INFORMATION:  HA    TECHNIQUE:  Axial CT images were acquired through the head.  Intravenous contrast: None  Two-dimensional reformats were generated.    COMPARISON STUDY: CT head 9/10/2018, MRI brain 4/15/2016    FINDINGS:    There is no CT evidence of acute intracranial hemorrhage, mass effect,   midline shift, or acute, large territorial infarct.  The ventricles and   sulci are age-appropriate in size and configuration. The basal cisterns   are patent. There is a partially empty sella.    Mild patchy periventricular and subcortical white matter hypodensities   are nonspecific, although likely on the basis of chronic smallvessel   ischemic disease.    The mastoid air cells and middle ear cavities are grossly clear. There is   mucosal thickening in the left maxillary sinus, the remaining visualized   paranasal sinuses are well aerated. There are bilateral ocular lens   replacements.    The calvarium and skull base are grossly intact.    IMPRESSION:  No acute intracranial hemorrhage or mass effect.    --- End of Report ---        YVONNE GRIFFITH MD; Attending Radiologist  This document has been electronically signed. Jun 12 2022  8:37AM    < end of copied text >    Imaging  Reviewed:  YES     Consultant(s) Notes Reviewed:   YES/ No      Plan of care was discussed with patient and /or primary care giver; all questions and concerns were addressed

## 2022-06-13 NOTE — CONSULT NOTE ADULT - SUBJECTIVE AND OBJECTIVE BOX
***TEMPLATE ONLY**      Patient is a 76y old  Female who presents with a chief complaint of HYPONATREMIA (13 Jun 2022 12:12)      HPI:  76 year old female from home, independent, has past medical hx of hypertension, HLD, migraines, DM on glimepiride, dizziness came to ED c/o worsening migraine in the last 2 days but has had headache for about 3 weeks since coming back from her [place]. Patient denies any dizziness, lightheadedness, chest pain, sob, cough, fever, focal weakness, sick contacts. Of note, patient went to her PCP for migraine who prescribed Butalb- acetaminophen -caffeine and pregabalin which did not help.   In ED s/p 1L NS, Reglan and Tylenol with some improvement  (12 Jun 2022 14:24)         Neurological Review of Systems:  No difficulty with language.  No vision loss or double vision.  No dizziness, vertigo or new hearing loss.  No difficulty with speech or swallowing.  No focal weakness.  No focal sensory changes.  No numbness or tingling in the bilateral lower extremities.  No difficulty with balance.  No difficulty with ambulation.        MEDICATIONS  (STANDING):  aspirin enteric coated 81 milliGRAM(s) Oral daily  atorvastatin 10 milliGRAM(s) Oral at bedtime  enoxaparin Injectable 40 milliGRAM(s) SubCutaneous every 24 hours  hydrALAZINE 10 milliGRAM(s) Oral three times a day  insulin lispro (ADMELOG) corrective regimen sliding scale   SubCutaneous three times a day before meals  insulin lispro (ADMELOG) corrective regimen sliding scale   SubCutaneous at bedtime  ketorolac   Injectable 15 milliGRAM(s) IV Push once  losartan 100 milliGRAM(s) Oral daily  metoclopramide Injectable 10 milliGRAM(s) IV Push once  pregabalin 25 milliGRAM(s) Oral daily  pregabalin 50 milliGRAM(s) Oral at bedtime  valproate sodium  IVPB 500 milliGRAM(s) IV Intermittent once    MEDICATIONS  (PRN):  acetaminophen     Tablet .. 650 milliGRAM(s) Oral every 6 hours PRN Temp greater or equal to 38C (100.4F), Mild Pain (1 - 3)  melatonin 3 milliGRAM(s) Oral at bedtime PRN Insomnia  ondansetron Injectable 4 milliGRAM(s) IV Push every 8 hours PRN Nausea and/or Vomiting    Allergies    loratadine (Other; Nausea)  methicillin (Nausea; Rash)    Intolerances      PAST MEDICAL & SURGICAL HISTORY:  High cholesterol      Hypertension      Migraine      Diabetes mellitus      No significant past surgical history        FAMILY HISTORY:  Family history of MI (myocardial infarction)  two brothers      SOCIAL HISTORY: non smoker/ former smoker/ active smoker    Review of Systems:  Constitutional: No generalized weakness. No fevers or chills.                    Eyes, Ears, Mouth, Throat: No vision loss   Respiratory: No shortness of breath or cough.                                Cardiovascular: No chest pain or palpitations  Gastrointestinal: No nausea or vomiting.                                         Genitourinary: No urinary incontinence or burning on urination.  Musculoskeletal: No joint pain.                                                           Dermatologic: No rash.  Neurological: as per HPI                                                                      Psychiatric: No behavioral problems.  Endocrine: No known hypoglycemia.               Hematologic/Lymphatic: No easy bleeding.    O:  Vital Signs Last 24 Hrs  T(C): 36.9 (13 Jun 2022 14:00), Max: 36.9 (13 Jun 2022 14:00)  T(F): 98.4 (13 Jun 2022 14:00), Max: 98.4 (13 Jun 2022 14:00)  HR: 71 (13 Jun 2022 14:00) (62 - 82)  BP: 160/70 (13 Jun 2022 14:00) (160/70 - 191/70)  BP(mean): --  RR: 18 (13 Jun 2022 14:00) (17 - 18)  SpO2: 100% (13 Jun 2022 14:00) (97% - 100%)    General Exam:   General appearance: No acute distress                 Cardiovascular: Pedal dorsalis pulses intact bilaterally    Mental Status: Orientated to self, date and place.  Attention intact.  No dysarthria, aphasia or neglect.  Knowledge intact.  Registration intact.  Short and long term memory grossly intact.      Cranial Nerves: CN I - not tested.  PERRL, EOMI, VFF, no nystagmus or diplopia.  No APD.  Fundi not visualized.  CN V1-3 intact to light touch and pinprick.  No facial asymmetry.  Hearing intact to finger rub bilaterally.  Tongue, uvula and palate midline.  Sternocleidomastoid and Trapezius intact bilaterally.    Motor:   Tone: normal.                  Strength intact throughout  No pronator drift bilaterally                      No dysmetria on finger-nose-finger or heel-shin-heel  No truncal ataxia.  No resting, postural or action tremor.  No myoclonus.    Sensation: intact to light touch, pinprick, vibration and proprioception    Deep Tendon Reflexes: 1+ bilateral biceps, triceps, brachioradialis, knee and ankle  Toes flexor bilaterally    Gait: normal and stable.  Rhomberg -eliana.    Other:     LABS:                        11.1   7.56  )-----------( 241      ( 13 Jun 2022 07:04 )             30.3     06-13    129<L>  |  94<L>  |  9   ----------------------------<  135<H>  4.4   |  26  |  0.69    Ca    9.4      13 Jun 2022 07:04  Phos  3.1     06-13  Mg     2.0     06-13    TPro  7.0  /  Alb  3.2<L>  /  TBili  0.4  /  DBili  x   /  AST  19  /  ALT  29  /  AlkPhos  55  06-13    < from: CT Head No Cont (06.12.22 @ 08:24) > (IMAGES REVIEWED)    ACC: [id number] EXAM:  CT BRAIN                          PROCEDURE DATE:  06/12/2022          INTERPRETATION:  CLINICAL INFORMATION:  HA    TECHNIQUE:  Axial CT images were acquired through the head.  Intravenous contrast: None  Two-dimensional reformats were generated.    COMPARISON STUDY: CT head 9/10/2018, MRI brain 4/15/2016    FINDINGS:    There is no CT evidence of acute intracranial hemorrhage, mass effect,   midline shift, or acute, large territorial infarct.  The ventricles and   sulci are age-appropriate in size and configuration. The basal cisterns   are patent. There is a partially empty sella.    Mild patchy periventricular and subcortical white matter hypodensities   are nonspecific, although likely on the basis of chronic smallvessel   ischemic disease.    The mastoid air cells and middle ear cavities are grossly clear. There is   mucosal thickening in the left maxillary sinus, the remaining visualized   paranasal sinuses are well aerated. There are bilateral ocular lens   replacements.    The calvarium and skull base are grossly intact.    IMPRESSION:  No acute intracranial hemorrhage or mass effect.    --- End of Report ---            YVONNE GRIFFITH MD; Attending Radiologist  This document has been electronically signed. Jun 12 2022  8:37AM    < end of copied text >  < from: 12 Lead ECG (05.27.22 @ 20:27) >    Ventricular Rate 79 BPM    Atrial Rate 79 BPM    P-R Interval 158 ms    QRS Duration 82 ms    Q-T Interval 368 ms    QTC Calculation(Bazett) 421 ms    R Axis -26 degrees    T Axis 146 degrees    Diagnosis Line Normal sinus rhythm  Septal infarct , age undetermined  Inferior infarct , age undetermined  T wave abnormality, consider lateral ischemia  Abnormal ECG    Confirmed by KERA LONDONO MD ([id number]) on 5/31/2022 12:58:37 PM    < end of copied text >          RADIOLOGY & ADDITIONAL STUDIES:         Patient is a 76y old  Female who presents with a chief complaint of HYPONATREMIA (13 Jun 2022 12:12)      HPI:  76 year old female from home, independent, has past medical hx of hypertension, HLD, migraines, DM on glimepiride, dizziness came to ED c/o worsening headache.  For the past few weeks, the patient has been having posterior cervical and temporal pressure like pain which has been continuos in nature without any migranous features.  Her BP has been also elevated for the past few weeks in the 200's.      This is different than her prior headache which has involved the vertex.    Neurological Review of Systems:  No difficulty with language.  No vision loss or double vision.  No dizziness, vertigo or new hearing loss.  No difficulty with speech or swallowing.  No focal weakness.  No focal sensory changes.  No numbness or tingling in the bilateral lower extremities.  No difficulty with balance.  No difficulty with ambulation.        MEDICATIONS  (STANDING):  aspirin enteric coated 81 milliGRAM(s) Oral daily  atorvastatin 10 milliGRAM(s) Oral at bedtime  enoxaparin Injectable 40 milliGRAM(s) SubCutaneous every 24 hours  hydrALAZINE 10 milliGRAM(s) Oral three times a day  insulin lispro (ADMELOG) corrective regimen sliding scale   SubCutaneous three times a day before meals  insulin lispro (ADMELOG) corrective regimen sliding scale   SubCutaneous at bedtime  ketorolac   Injectable 15 milliGRAM(s) IV Push once  losartan 100 milliGRAM(s) Oral daily  metoclopramide Injectable 10 milliGRAM(s) IV Push once  pregabalin 25 milliGRAM(s) Oral daily  pregabalin 50 milliGRAM(s) Oral at bedtime  valproate sodium  IVPB 500 milliGRAM(s) IV Intermittent once    MEDICATIONS  (PRN):  acetaminophen     Tablet .. 650 milliGRAM(s) Oral every 6 hours PRN Temp greater or equal to 38C (100.4F), Mild Pain (1 - 3)  melatonin 3 milliGRAM(s) Oral at bedtime PRN Insomnia  ondansetron Injectable 4 milliGRAM(s) IV Push every 8 hours PRN Nausea and/or Vomiting    Allergies    loratadine (Other; Nausea)  methicillin (Nausea; Rash)    Intolerances      PAST MEDICAL & SURGICAL HISTORY:  High cholesterol      Hypertension      Migraine      Diabetes mellitus      No significant past surgical history        FAMILY HISTORY:  Family history of MI (myocardial infarction)  two brothers      SOCIAL HISTORY: non smoker    Review of Systems:  Constitutional: No fevers .                    Eyes, Ears, Mouth, Throat: No vision loss   Respiratory: No SOB                       Cardiovascular: No chest pain   Gastrointestinal: No nausea or vomiting.                                         Genitourinary: No burning on urination.  Musculoskeletal: No joint pain.                                                           Dermatologic: No rash.  Neurological: as per HPI                                                                      Psychiatric: No behavioral problems.  Endocrine: No known hypoglycemia.               Hematologic/Lymphatic: No easy bleeding.    O:  Vital Signs Last 24 Hrs  T(C): 36.9 (13 Jun 2022 14:00), Max: 36.9 (13 Jun 2022 14:00)  T(F): 98.4 (13 Jun 2022 14:00), Max: 98.4 (13 Jun 2022 14:00)  HR: 71 (13 Jun 2022 14:00) (62 - 82)  BP: 160/70 (13 Jun 2022 14:00) (160/70 - 191/70)  BP(mean): --  RR: 18 (13 Jun 2022 14:00) (17 - 18)  SpO2: 100% (13 Jun 2022 14:00) (97% - 100%)    General Exam:   General appearance: No acute distress                 Cardiovascular: Pedal dorsalis pulses intact bilaterally    Mental Status: Orientated to self, date and place.  Attention intact.  No dysarthria, aphasia or neglect.  Knowledge intact.  Registration intact.  Short and long term memory grossly intact.      Cranial Nerves: CN I - not tested.  PERRL, EOMI, VFF, no nystagmus or diplopia.  No APD.  Fundi not visualized.  CN V1-3 intact to light touch.  No facial asymmetry.  Hearing intact to finger rub bilaterally.  Tongue, uvula and palate midline.  Sternocleidomastoid and Trapezius intact bilaterally.    Motor:   Tone: normal.                  Strength intact throughout  No pronator drift bilaterally                      No dysmetria on finger-nose-finger or heel-shin-heel  No truncal ataxia.  No resting, postural or action tremor.  No myoclonus.    Sensation: intact to light touch    Deep Tendon Reflexes: 1+ bilateral biceps, triceps, brachioradialis, knee and ankle  Toes flexor bilaterally    Gait: normal and stable.      Other: cervical muscle spasm    LABS:                        11.1   7.56  )-----------( 241      ( 13 Jun 2022 07:04 )             30.3     06-13    129<L>  |  94<L>  |  9   ----------------------------<  135<H>  4.4   |  26  |  0.69    Ca    9.4      13 Jun 2022 07:04  Phos  3.1     06-13  Mg     2.0     06-13    TPro  7.0  /  Alb  3.2<L>  /  TBili  0.4  /  DBili  x   /  AST  19  /  ALT  29  /  AlkPhos  55  06-13    < from: CT Head No Cont (06.12.22 @ 08:24) > (IMAGES REVIEWED)    ACC: [id number] EXAM:  CT BRAIN                          PROCEDURE DATE:  06/12/2022          INTERPRETATION:  CLINICAL INFORMATION:  HA    TECHNIQUE:  Axial CT images were acquired through the head.  Intravenous contrast: None  Two-dimensional reformats were generated.    COMPARISON STUDY: CT head 9/10/2018, MRI brain 4/15/2016    FINDINGS:    There is no CT evidence of acute intracranial hemorrhage, mass effect,   midline shift, or acute, large territorial infarct.  The ventricles and   sulci are age-appropriate in size and configuration. The basal cisterns   are patent. There is a partially empty sella.    Mild patchy periventricular and subcortical white matter hypodensities   are nonspecific, although likely on the basis of chronic smallvessel   ischemic disease.    The mastoid air cells and middle ear cavities are grossly clear. There is   mucosal thickening in the left maxillary sinus, the remaining visualized   paranasal sinuses are well aerated. There are bilateral ocular lens   replacements.    The calvarium and skull base are grossly intact.    IMPRESSION:  No acute intracranial hemorrhage or mass effect.    --- End of Report ---            YVONNE GRIFFITH MD; Attending Radiologist  This document has been electronically signed. Jun 12 2022  8:37AM    < end of copied text >  < from: 12 Lead ECG (05.27.22 @ 20:27) >    Ventricular Rate 79 BPM    Atrial Rate 79 BPM    P-R Interval 158 ms    QRS Duration 82 ms    Q-T Interval 368 ms    QTC Calculation(Bazett) 421 ms    R Axis -26 degrees    T Axis 146 degrees    Diagnosis Line Normal sinus rhythm  Septal infarct , age undetermined  Inferior infarct , age undetermined  T wave abnormality, consider lateral ischemia  Abnormal ECG    Confirmed by KERA LONDONO MD ([id number]) on 5/31/2022 12:58:37 PM    < end of copied text >          RADIOLOGY & ADDITIONAL STUDIES:

## 2022-06-13 NOTE — PROGRESS NOTE ADULT - ASSESSMENT
76 year old female from home, independent, with past medical hx of hypertension, HLD, migraines, DM, dizziness, presented for one month of persistent headache. Head CT in ED negative for acute findings, but found to be hyponatremic with Na+ of 124. patient given 1L bolus in ed and admitted to medicine for hyponatremia. patient noted to be hypertensive, on losartan home dose, and started on hydralazine 25mg TID. Neuro and Nephro consulted.

## 2022-06-13 NOTE — PROGRESS NOTE ADULT - PROBLEM SELECTOR PLAN 1
- Patient presented with headache - refers taking less salt and on HCTZ at home   - Moderate hyponatremia most likely 2/2 from HCTZ and low salt diet   - No change in mental status or seizures, CTH no acute changes   - Goal is to increase 4-6 mEq in 24 hrs, avoid overcorrection  -Na+ level trending upward   - F/u serum osmolarity, urine osmolarity, urine Cr, Urine lytes, TSH  - Monitor BMP q8 hrs   - Monitor intake and output   - Hold HCTZ, pain management   - Seizure and aspiration precautions  -nephro Dr. Pennington consulted

## 2022-06-13 NOTE — PROGRESS NOTE ADULT - SUBJECTIVE AND OBJECTIVE BOX
NP Note discussed with  Primary Attending    Patient is a 76y old  Female who presents with a chief complaint of HYPONATREMIA (12 Jun 2022 14:24)    INTERVAL HPI/OVERNIGHT EVENTS: no new complaints    MEDICATIONS  (STANDING):  aspirin enteric coated 81 milliGRAM(s) Oral daily  atorvastatin 10 milliGRAM(s) Oral at bedtime  enoxaparin Injectable 40 milliGRAM(s) SubCutaneous every 24 hours  hydrALAZINE 10 milliGRAM(s) Oral three times a day  insulin lispro (ADMELOG) corrective regimen sliding scale   SubCutaneous three times a day before meals  insulin lispro (ADMELOG) corrective regimen sliding scale   SubCutaneous at bedtime  losartan 100 milliGRAM(s) Oral daily  pregabalin 25 milliGRAM(s) Oral daily  pregabalin 50 milliGRAM(s) Oral at bedtime    MEDICATIONS  (PRN):  acetaminophen     Tablet .. 650 milliGRAM(s) Oral every 6 hours PRN Temp greater or equal to 38C (100.4F), Mild Pain (1 - 3)  melatonin 3 milliGRAM(s) Oral at bedtime PRN Insomnia  ondansetron Injectable 4 milliGRAM(s) IV Push every 8 hours PRN Nausea and/or Vomiting      __________________________________________________  REVIEW OF SYSTEMS:    CONSTITUTIONAL: No fever,   EYES: no acute visual disturbances  NECK: No pain or stiffness  RESPIRATORY: No cough; No shortness of breath  CARDIOVASCULAR: No chest pain, no palpitations  GASTROINTESTINAL: No pain. No nausea or vomiting; No diarrhea   NEUROLOGICAL: +persistent posterior headaches with band-like pain behind bilateral ears.   MUSCULOSKELETAL: No joint pain, no muscle pain  GENITOURINARY: no dysuria, no frequency, no hesitancy  PSYCHIATRY: no depression , no anxiety  ALL OTHER  ROS negative        Vital Signs Last 24 Hrs  T(C): 36.2 (13 Jun 2022 05:00), Max: 36.7 (12 Jun 2022 15:50)  T(F): 97.2 (13 Jun 2022 05:00), Max: 98 (12 Jun 2022 15:50)  HR: 82 (13 Jun 2022 11:11) (62 - 82)  BP: 175/78 (13 Jun 2022 11:11) (168/65 - 191/70)  BP(mean): --  RR: 18 (13 Jun 2022 08:30) (17 - 18)  SpO2: 97% (13 Jun 2022 05:00) (97% - 100%)    ________________________________________________  PHYSICAL EXAM:  GENERAL: NAD  HEENT: Normocephalic;  conjunctivae and sclerae clear; moist mucous membranes;   NECK : supple  CHEST/LUNG: Clear to auscultation bilaterally with good air entry   HEART: S1 S2  regular; no murmurs, gallops or rubs  ABDOMEN: Soft, Nontender, Nondistended; Bowel sounds present  EXTREMITIES: no cyanosis; no edema; no calf tenderness  SKIN: warm and dry; no rash  NERVOUS SYSTEM:  Awake and alert; Oriented  to place, person and time ; no new deficits  _________________________________________________  LABS:                        11.1   7.56  )-----------( 241      ( 13 Jun 2022 07:04 )             30.3     06-13    129<L>  |  94<L>  |  9   ----------------------------<  135<H>  4.4   |  26  |  0.69    Ca    9.4      13 Jun 2022 07:04  Phos  3.1     06-13  Mg     2.0     06-13    TPro  7.0  /  Alb  3.2<L>  /  TBili  0.4  /  DBili  x   /  AST  19  /  ALT  29  /  AlkPhos  55  06-13        CAPILLARY BLOOD GLUCOSE      POCT Blood Glucose.: 115 mg/dL (13 Jun 2022 12:08)  POCT Blood Glucose.: 154 mg/dL (13 Jun 2022 07:37)  POCT Blood Glucose.: 151 mg/dL (12 Jun 2022 21:13)  POCT Blood Glucose.: 136 mg/dL (12 Jun 2022 18:55)    RADIOLOGY & ADDITIONAL TESTS:    < from: CT Head No Cont (06.12.22 @ 08:24) >    ACC: [id number] EXAM:  CT BRAIN                          PROCEDURE DATE:  06/12/2022          INTERPRETATION:  CLINICAL INFORMATION:  HA    TECHNIQUE:  Axial CT images were acquired through the head.  Intravenous contrast: None  Two-dimensional reformats were generated.    COMPARISON STUDY: CT head 9/10/2018, MRI brain 4/15/2016    FINDINGS:    There is no CT evidence of acute intracranial hemorrhage, mass effect,   midline shift, or acute, large territorial infarct.  The ventricles and   sulci are age-appropriate in size and configuration. The basal cisterns   are patent. There is a partially empty sella.    Mild patchy periventricular and subcortical white matter hypodensities   are nonspecific, although likely on the basis of chronic smallvessel   ischemic disease.    The mastoid air cells and middle ear cavities are grossly clear. There is   mucosal thickening in the left maxillary sinus, the remaining visualized   paranasal sinuses are well aerated. There are bilateral ocular lens   replacements.    The calvarium and skull base are grossly intact.    IMPRESSION:  No acute intracranial hemorrhage or mass effect.    --- End of Report ---        YVONNE GRIFFITH MD; Attending Radiologist  This document has been electronically signed. Jun 12 2022  8:37AM    < end of copied text >    Imaging  Reviewed:  YES     Consultant(s) Notes Reviewed:   YES/ No      Plan of care was discussed with patient and /or primary care giver; all questions and concerns were addressed  NP Note discussed with  Primary Attending    Patient is a 76y old  Female who presents with a chief complaint of HYPONATREMIA (12 Jun 2022 14:24)    INTERVAL HPI/OVERNIGHT EVENTS: no new complaints    MEDICATIONS  (STANDING):  aspirin enteric coated 81 milliGRAM(s) Oral daily  atorvastatin 10 milliGRAM(s) Oral at bedtime  enoxaparin Injectable 40 milliGRAM(s) SubCutaneous every 24 hours  hydrALAZINE 10 milliGRAM(s) Oral three times a day  insulin lispro (ADMELOG) corrective regimen sliding scale   SubCutaneous three times a day before meals  insulin lispro (ADMELOG) corrective regimen sliding scale   SubCutaneous at bedtime  losartan 100 milliGRAM(s) Oral daily  pregabalin 25 milliGRAM(s) Oral daily  pregabalin 50 milliGRAM(s) Oral at bedtime    MEDICATIONS  (PRN):  acetaminophen     Tablet .. 650 milliGRAM(s) Oral every 6 hours PRN Temp greater or equal to 38C (100.4F), Mild Pain (1 - 3)  melatonin 3 milliGRAM(s) Oral at bedtime PRN Insomnia  ondansetron Injectable 4 milliGRAM(s) IV Push every 8 hours PRN Nausea and/or Vomiting      __________________________________________________  REVIEW OF SYSTEMS:    CONSTITUTIONAL: No fever,   EYES: no acute visual disturbances  NECK: +posterior neck pain   RESPIRATORY: No cough; No shortness of breath  CARDIOVASCULAR: No chest pain, no palpitations  GASTROINTESTINAL: No pain. No nausea or vomiting; No diarrhea   NEUROLOGICAL: +persistent posterior pressure like headaches with band-like pain behind bilateral ears.   MUSCULOSKELETAL: No joint pain, no muscle pain  GENITOURINARY: no dysuria, no frequency, no hesitancy  PSYCHIATRY: no depression , no anxiety  ALL OTHER  ROS negative        Vital Signs Last 24 Hrs  T(C): 36.2 (13 Jun 2022 05:00), Max: 36.7 (12 Jun 2022 15:50)  T(F): 97.2 (13 Jun 2022 05:00), Max: 98 (12 Jun 2022 15:50)  HR: 82 (13 Jun 2022 11:11) (62 - 82)  BP: 175/78 (13 Jun 2022 11:11) (168/65 - 191/70)  BP(mean): --  RR: 18 (13 Jun 2022 08:30) (17 - 18)  SpO2: 97% (13 Jun 2022 05:00) (97% - 100%)    ________________________________________________  PHYSICAL EXAM:  GENERAL: NAD  HEENT: Normocephalic;  conjunctivae and sclerae clear; moist mucous membranes;   NECK : supple  CHEST/LUNG: Clear to auscultation bilaterally with good air entry   HEART: S1 S2  regular; no murmurs, gallops or rubs  ABDOMEN: Soft, Nontender, Nondistended; Bowel sounds present  EXTREMITIES: no cyanosis; no edema; no calf tenderness  SKIN: warm and dry; no rash  NERVOUS SYSTEM:  Awake and alert; Oriented  to place, person and time ; no new deficits  _________________________________________________  LABS:                        11.1   7.56  )-----------( 241      ( 13 Jun 2022 07:04 )             30.3     06-13    129<L>  |  94<L>  |  9   ----------------------------<  135<H>  4.4   |  26  |  0.69    Ca    9.4      13 Jun 2022 07:04  Phos  3.1     06-13  Mg     2.0     06-13    TPro  7.0  /  Alb  3.2<L>  /  TBili  0.4  /  DBili  x   /  AST  19  /  ALT  29  /  AlkPhos  55  06-13        CAPILLARY BLOOD GLUCOSE      POCT Blood Glucose.: 115 mg/dL (13 Jun 2022 12:08)  POCT Blood Glucose.: 154 mg/dL (13 Jun 2022 07:37)  POCT Blood Glucose.: 151 mg/dL (12 Jun 2022 21:13)  POCT Blood Glucose.: 136 mg/dL (12 Jun 2022 18:55)    RADIOLOGY & ADDITIONAL TESTS:    < from: CT Head No Cont (06.12.22 @ 08:24) >    ACC: [id number] EXAM:  CT BRAIN                          PROCEDURE DATE:  06/12/2022          INTERPRETATION:  CLINICAL INFORMATION:  HA    TECHNIQUE:  Axial CT images were acquired through the head.  Intravenous contrast: None  Two-dimensional reformats were generated.    COMPARISON STUDY: CT head 9/10/2018, MRI brain 4/15/2016    FINDINGS:    There is no CT evidence of acute intracranial hemorrhage, mass effect,   midline shift, or acute, large territorial infarct.  The ventricles and   sulci are age-appropriate in size and configuration. The basal cisterns   are patent. There is a partially empty sella.    Mild patchy periventricular and subcortical white matter hypodensities   are nonspecific, although likely on the basis of chronic smallvessel   ischemic disease.    The mastoid air cells and middle ear cavities are grossly clear. There is   mucosal thickening in the left maxillary sinus, the remaining visualized   paranasal sinuses are well aerated. There are bilateral ocular lens   replacements.    The calvarium and skull base are grossly intact.    IMPRESSION:  No acute intracranial hemorrhage or mass effect.    --- End of Report ---        YVONNE GRIFFITH MD; Attending Radiologist  This document has been electronically signed. Jun 12 2022  8:37AM    < end of copied text >    Imaging  Reviewed:  YES     Consultant(s) Notes Reviewed:   YES/ No      Plan of care was discussed with patient and /or primary care giver; all questions and concerns were addressed

## 2022-06-13 NOTE — PROGRESS NOTE ADULT - PROBLEM SELECTOR PLAN 2
- hx of migraine   -S/p 1L NS, Reglan and Tylenol in ED with improvement   - will give Reglan 10 mg IV, Toradol 15 mg IV and Depacon 500 mg x1 IV and monitor effectiveness.   - Neuro Dr. Marie consulted

## 2022-06-13 NOTE — PROGRESS NOTE ADULT - PROBLEM SELECTOR PLAN 3
- Patient has history of Hypertension on losartan HCTZ at home   - C/w home meds with parameters  - Hold HCTZ for now due to hyponatremia   - DASH diet  - Monitor BP and adjust meds as needed - Patient has history of Hypertension on losartan and HCTZ at home   -uncontrolled   - losartan continued  - Hold HCTZ for now due to hyponatremia   - Hydralazine 10mg TID   - Monitor BP and adjust meds as needed - Patient has history of Hypertension on losartan and HCTZ at home   -uncontrolled   - losartan continued  - Hold HCTZ for now due to hyponatremia   - [id number] IV x 1 now and then start 25mg PO TID   - Monitor BP and adjust meds as needed

## 2022-06-13 NOTE — PROGRESS NOTE ADULT - PROBLEM SELECTOR PLAN 1
- Patient presented with headache - migraine, refers taking less salt and on HCTZ at home   - Moderate hyponatremia most likely multifactorial from HCTZ, low salt and acute pain   - No change in mental status or seizures, CTH no acute changes   - Goal is to increase 4-6 mEq in 24 hrs, avoid overcorrection  - Possible SIADH from pain & HCTZ  - F/u serum osmolarity, urine osmolarity, urine Cr, Urine lytes, TSH  - Monitor BMP q8 hrs   - Monitor intake and output   - Hold HCTZ, pain management   - Seizure and aspiration precautions  - Consider Nephro consult for further recommendations - Patient presented with headache - refers taking less salt and on HCTZ at home   - Moderate hyponatremia most likely 2/2 from HCTZ and low salt diet   - No change in mental status or seizures, CTH no acute changes   - Goal is to increase 4-6 mEq in 24 hrs, avoid overcorrection  -Na+ level trending upward   - F/u serum osmolarity, urine osmolarity, urine Cr, Urine lytes, TSH  - Monitor BMP q8 hrs   - Monitor intake and output   - Hold HCTZ, pain management   - Seizure and aspiration precautions  -nephro Dr. Pennington consulted

## 2022-06-13 NOTE — PROGRESS NOTE ADULT - ASSESSMENT
76 year old female from home, independent, with past medical hx of hypertension, HLD, migraines, DM, dizziness, presented for one month of persistent headache. Head CT in ED negative for acute findings, but found to be hyponatremic with Na+ of 124. patient given 1L bolus in ed and admitted to medicine for hyponatremia.    76 year old female from home, independent, with past medical hx of hypertension, HLD, migraines, DM, dizziness, presented for one month of persistent headache. Head CT in ED negative for acute findings, but found to be hyponatremic with Na+ of 124. patient given 1L bolus in ed and admitted to medicine for hyponatremia. patient noted to be hypertensive, on losartan home dose, and started on hydralazine 10mg TID. Neuro and Nephro consulted.     76 year old female from home, independent, with past medical hx of hypertension, HLD, migraines, DM, dizziness, presented for one month of persistent headache. Head CT in ED negative for acute findings, but found to be hyponatremic with Na+ of 124. patient given 1L bolus in ed and admitted to medicine for hyponatremia. patient noted to be hypertensive, on losartan home dose, and started on hydralazine 25mg TID. Neuro and Nephro consulted.

## 2022-06-13 NOTE — PROGRESS NOTE ADULT - PROBLEM SELECTOR PLAN 4
- Patient has hx of DM on Glimepiride at home  - Holding home po medications  - Started on HSS  - Fingerstick before meals and at bedtime  - DASH diet with consistent carb  - Target -180  - F/u A1c - Patient has hx of DM on Glimepiride at home  - Holding home po medications  -controlled   -A1C: 6.4   - Started on ISS   - DASH diet with consistent carb  - Target -180

## 2022-06-13 NOTE — PROGRESS NOTE ADULT - PROBLEM SELECTOR PLAN 2
- Patient has hx of migraine  - S/p 1L NS, Reglan and Tylenol in ED with improvement   - May start Benadryl 25 mg IV, Reglan 10 mg IV, Toradol 15 mg IV and Depacon 500 mg x1 IV  - Consider Neuro consult in the am - hx of migraine   -S/p 1L NS, Reglan and Tylenol in ED with improvement   - will give Reglan 10 mg IV, Toradol 15 mg IV and Depacon 500 mg x1 IV and monitor effectiveness.   - Neuro Dr. Marie consulted

## 2022-06-14 VITALS
RESPIRATION RATE: 18 BRPM | HEART RATE: 88 BPM | DIASTOLIC BLOOD PRESSURE: 66 MMHG | TEMPERATURE: 98 F | SYSTOLIC BLOOD PRESSURE: 185 MMHG | OXYGEN SATURATION: 97 %

## 2022-06-14 LAB
ALBUMIN SERPL ELPH-MCNC: 3.3 G/DL — LOW (ref 3.5–5)
ALP SERPL-CCNC: 62 U/L — SIGNIFICANT CHANGE UP (ref 40–120)
ALT FLD-CCNC: 30 U/L DA — SIGNIFICANT CHANGE UP (ref 10–60)
ANION GAP SERPL CALC-SCNC: 9 MMOL/L — SIGNIFICANT CHANGE UP (ref 5–17)
AST SERPL-CCNC: 20 U/L — SIGNIFICANT CHANGE UP (ref 10–40)
BILIRUB SERPL-MCNC: 0.5 MG/DL — SIGNIFICANT CHANGE UP (ref 0.2–1.2)
BUN SERPL-MCNC: 10 MG/DL — SIGNIFICANT CHANGE UP (ref 7–18)
CALCIUM SERPL-MCNC: 9.3 MG/DL — SIGNIFICANT CHANGE UP (ref 8.4–10.5)
CHLORIDE SERPL-SCNC: 96 MMOL/L — SIGNIFICANT CHANGE UP (ref 96–108)
CO2 SERPL-SCNC: 23 MMOL/L — SIGNIFICANT CHANGE UP (ref 22–31)
CREAT SERPL-MCNC: 0.72 MG/DL — SIGNIFICANT CHANGE UP (ref 0.5–1.3)
EGFR: 86 ML/MIN/1.73M2 — SIGNIFICANT CHANGE UP
GLUCOSE BLDC GLUCOMTR-MCNC: 115 MG/DL — HIGH (ref 70–99)
GLUCOSE BLDC GLUCOMTR-MCNC: 144 MG/DL — HIGH (ref 70–99)
GLUCOSE SERPL-MCNC: 139 MG/DL — HIGH (ref 70–99)
HCT VFR BLD CALC: 32.2 % — LOW (ref 34.5–45)
HGB BLD-MCNC: 11.5 G/DL — SIGNIFICANT CHANGE UP (ref 11.5–15.5)
MCHC RBC-ENTMCNC: 29.4 PG — SIGNIFICANT CHANGE UP (ref 27–34)
MCHC RBC-ENTMCNC: 35.7 GM/DL — SIGNIFICANT CHANGE UP (ref 32–36)
MCV RBC AUTO: 82.4 FL — SIGNIFICANT CHANGE UP (ref 80–100)
NRBC # BLD: 0 /100 WBCS — SIGNIFICANT CHANGE UP (ref 0–0)
PLATELET # BLD AUTO: 254 K/UL — SIGNIFICANT CHANGE UP (ref 150–400)
POTASSIUM SERPL-MCNC: 4.2 MMOL/L — SIGNIFICANT CHANGE UP (ref 3.5–5.3)
POTASSIUM SERPL-SCNC: 4.2 MMOL/L — SIGNIFICANT CHANGE UP (ref 3.5–5.3)
PROT SERPL-MCNC: 7.3 G/DL — SIGNIFICANT CHANGE UP (ref 6–8.3)
RBC # BLD: 3.91 M/UL — SIGNIFICANT CHANGE UP (ref 3.8–5.2)
RBC # FLD: 12.1 % — SIGNIFICANT CHANGE UP (ref 10.3–14.5)
SODIUM SERPL-SCNC: 128 MMOL/L — LOW (ref 135–145)
WBC # BLD: 8.28 K/UL — SIGNIFICANT CHANGE UP (ref 3.8–10.5)
WBC # FLD AUTO: 8.28 K/UL — SIGNIFICANT CHANGE UP (ref 3.8–10.5)

## 2022-06-14 PROCEDURE — 87635 SARS-COV-2 COVID-19 AMP PRB: CPT

## 2022-06-14 PROCEDURE — 85025 COMPLETE CBC W/AUTO DIFF WBC: CPT

## 2022-06-14 PROCEDURE — 96374 THER/PROPH/DIAG INJ IV PUSH: CPT

## 2022-06-14 PROCEDURE — 70450 CT HEAD/BRAIN W/O DYE: CPT | Mod: MA

## 2022-06-14 PROCEDURE — 84540 ASSAY OF URINE/UREA-N: CPT

## 2022-06-14 PROCEDURE — 99232 SBSQ HOSP IP/OBS MODERATE 35: CPT

## 2022-06-14 PROCEDURE — 82570 ASSAY OF URINE CREATININE: CPT

## 2022-06-14 PROCEDURE — 83935 ASSAY OF URINE OSMOLALITY: CPT

## 2022-06-14 PROCEDURE — 99285 EMERGENCY DEPT VISIT HI MDM: CPT | Mod: 25

## 2022-06-14 PROCEDURE — 85027 COMPLETE CBC AUTOMATED: CPT

## 2022-06-14 PROCEDURE — 83930 ASSAY OF BLOOD OSMOLALITY: CPT

## 2022-06-14 PROCEDURE — 82962 GLUCOSE BLOOD TEST: CPT

## 2022-06-14 PROCEDURE — 84300 ASSAY OF URINE SODIUM: CPT

## 2022-06-14 PROCEDURE — 84443 ASSAY THYROID STIM HORMONE: CPT

## 2022-06-14 PROCEDURE — 83036 HEMOGLOBIN GLYCOSYLATED A1C: CPT

## 2022-06-14 PROCEDURE — 83735 ASSAY OF MAGNESIUM: CPT

## 2022-06-14 PROCEDURE — 80053 COMPREHEN METABOLIC PANEL: CPT

## 2022-06-14 PROCEDURE — 84100 ASSAY OF PHOSPHORUS: CPT

## 2022-06-14 PROCEDURE — 80048 BASIC METABOLIC PNL TOTAL CA: CPT

## 2022-06-14 PROCEDURE — 36415 COLL VENOUS BLD VENIPUNCTURE: CPT

## 2022-06-14 RX ORDER — GLIMEPIRIDE 1 MG
0 TABLET ORAL
Qty: 0 | Refills: 3 | DISCHARGE

## 2022-06-14 RX ORDER — SODIUM CHLORIDE 9 MG/ML
1000 INJECTION INTRAMUSCULAR; INTRAVENOUS; SUBCUTANEOUS
Refills: 0 | Status: DISCONTINUED | OUTPATIENT
Start: 2022-06-14 | End: 2022-06-14

## 2022-06-14 RX ORDER — ATORVASTATIN CALCIUM 80 MG/1
0 TABLET, FILM COATED ORAL
Qty: 0 | Refills: 0 | DISCHARGE

## 2022-06-14 RX ADMIN — Medication 81 MILLIGRAM(S): at 11:55

## 2022-06-14 RX ADMIN — Medication 650 MILLIGRAM(S): at 06:30

## 2022-06-14 RX ADMIN — LOSARTAN POTASSIUM 100 MILLIGRAM(S): 100 TABLET, FILM COATED ORAL at 05:34

## 2022-06-14 RX ADMIN — ENOXAPARIN SODIUM 40 MILLIGRAM(S): 100 INJECTION SUBCUTANEOUS at 05:34

## 2022-06-14 RX ADMIN — Medication 650 MILLIGRAM(S): at 05:34

## 2022-06-14 RX ADMIN — Medication 25 MILLIGRAM(S): at 05:34

## 2022-06-14 NOTE — PROGRESS NOTE ADULT - ASSESSMENT
Cervical muscle spasm and tension type headaches concerning for referred pain due to muscle spasm, hypertensive urgency/ emergency can be also contributing and will recommend to get MRI brain to r/o stroke.  Flexaril 5mg tid x 3-5 days for cervical muscle spasm.    will fu    belén NP and Dr. Parson

## 2022-06-14 NOTE — CONSULT NOTE ADULT - ASSESSMENT
Patient is a 77yo Female with HTN, HLD, Migraines DM type 2, p/w migraines x 2 days. Pt found to have Na 124. Nephrology consulted for hyponatremia    1. Hyponatremia- pt euvolemic on exam. ?SIADH due to pain. SOsm 266, Jayesh 60, & UOsm 219 on 6/12. TSH wnl. Recc to start NS @ 65ml/hr, however pt hesitant b/c her BP is elevated. Recc to repeat urine Osm and urine NA. Monitor serial BMP's and UO for overcorrection. Do not correct more than 8 meQ/24 hours. Monitor serum sodium.  2. Essential HTN- BP elevated this am. c/w Losartan 100mg PO daily. Would keep off HCTZ, even on d/c due to hyponatremia. Would consider CCB instead of hydralazine but pt states she thinks she is allergic to Amlodipine. c/w low salt diet. Monitor BP  3. Migraines- CT head neg. Neurology following  4. DM type 2- FS well controlled on ISS as per primary team      [facility] NEPHROLOGY  Deshawn Starkey M.D.  Sean Lucio D.O.  Destiny Pennington M.D.  Tawnya Boo, MSN, ANP-C  [phone]    [id number] [place] [address]  
Cervical muscle spasm and tension type headaches concerning for referred pain due to muscle spasm, hypertensive urgency/ emergency can be also contributing and will recommend to get MRI brain to r/o stroke.  Flexaril 5mg tid x 3-5 days for cervical muscle spasm.    will fu    belén NP and Dr. Parson

## 2022-06-14 NOTE — CHART NOTE - NSCHARTNOTEFT_GEN_A_CORE
EVENT:  76 year old female from home, independent, has past medical hx of hypertension, HLD, migraines, DM on glimepiride, dizziness came to ED c/o worsening migraine in the last 2 days but has had headache for about 3 weeks since coming back from [place]. Patient denies any dizziness, lightheadedness, chest pain, sob, cough, fever, focal weakness, sick contacts. Of note, patient went to her PCP for migraine who prescribed Butalb- acetaminophen -caffeine and pregabalin which did not help.     SUBJECTIVE:  Elevated blood pressure    OBJECTIVE:  Vital Signs Last 24 Hrs  T(C): 36.7 (14 Jun 2022 05:48), Max: 37 (13 Jun 2022 20:57)  T(F): 98 (14 Jun 2022 05:48), Max: 98.6 (13 Jun 2022 20:57)  HR: 83 (14 Jun 2022 05:32) (71 - 107)  BP: 167/78 (14 Jun 2022 05:32) (136/57 - 207/82)  BP(mean): 83 (13 Jun 2022 22:52) (83 - 83)  RR: 18 (14 Jun 2022 05:32) (18 - 18)  SpO2: 99% (14 Jun 2022 05:32) (96% - 100%)    LABS:                        11.1   7.56  )-----------( 241      ( 13 Jun 2022 07:04 )             30.3     06-13    129<L>  |  94<L>  |  9   ----------------------------<  135<H>  4.4   |  26  |  0.69    Ca    9.4      13 Jun 2022 07:04  Phos  3.1     06-13  Mg     2.0     06-13    TPro  7.0  /  Alb  3.2<L>  /  TBili  0.4  /  DBili  x   /  AST  19  /  ALT  29  /  AlkPhos  55  06-13      A/P:  Uncontrolled blood pressure  H/O HTN, Migraine headaches  -Hydralazine 10mg IVP given  -Continue BP medications  -Monitor BP

## 2022-06-14 NOTE — DISCHARGE NOTE PROVIDER - NSDCCPCAREPLAN_GEN_ALL_CORE_FT
PRINCIPAL DISCHARGE DIAGNOSIS  Diagnosis: Headache  Assessment and Plan of Treatment: You  were treated with reglan, tordol & depacon. Your symptoms improved slowly. A neurologis was consulted and recommended you for  for MRI head but you refused and went home against medical advise.      SECONDARY DISCHARGE DIAGNOSES  Diagnosis: Acute hyponatremia  Assessment and Plan of Treatment: On discharge, your sodium level was 128. You were advised to stay in the hospital to improve your sodium level to normalcy but you insisted to go home against medical advice.    Diagnosis: Hypertension  Assessment and Plan of Treatment: You were noted with high blood pressure and adjusted your b/p mdications. Your b/p was slowly improving but you went home against medical advice.

## 2022-06-14 NOTE — DISCHARGE NOTE PROVIDER - HOSPITAL COURSE
76 year old female from home, independent, with past medical hx of hypertension, HLD, migraines, DM, dizziness, presented for one month of persistent headache. Head CT in ED negative for acute findings, but found to be hyponatremic with Na+ of 124. Patient given 1L bolus in ED and admitted to medicine for hyponatremia. patient noted to be hypertensive, on losartan home dose, and started on hydralazine 25mg TID. Neuro and Nephro consulted.      The patient wishes to leave against medical advice.  I have discussed the risks, benefits and alternatives (including the possibility of worsening of disease, pain, permanent disability, and/or death) with the patient and his/her family (if available).  The patient voices understanding of these risks, benefits, and alternatives and still wishes to sign out against medical advice.  The patient is awake, alert, oriented  x 3 and has demonstrated capacity to refuse/direct care.  I have advised the patient that they can and should return immediately should they develop any worse/different/additional symptoms, or if they change their mind and want to continue their care.

## 2022-06-14 NOTE — PROGRESS NOTE ADULT - SUBJECTIVE AND OBJECTIVE BOX
Neurology Follow up note    Name  JESICA JOHNSON    Subjective:  no overnight issues    Review of Systems:  Constitutional:      no fever  Respiratory:                     no cough         MEDICATIONS  (STANDING):  aspirin enteric coated 81 milliGRAM(s) Oral daily  atorvastatin 10 milliGRAM(s) Oral at bedtime  enoxaparin Injectable 40 milliGRAM(s) SubCutaneous every 24 hours  hydrALAZINE 25 milliGRAM(s) Oral three times a day  insulin lispro (ADMELOG) corrective regimen sliding scale   SubCutaneous three times a day before meals  insulin lispro (ADMELOG) corrective regimen sliding scale   SubCutaneous at bedtime  losartan 100 milliGRAM(s) Oral daily  pregabalin 25 milliGRAM(s) Oral daily  pregabalin 50 milliGRAM(s) Oral at bedtime  sodium chloride 0.9%. 1000 milliLiter(s) (65 mL/Hr) IV Continuous <Continuous>    MEDICATIONS  (PRN):  acetaminophen     Tablet .. 650 milliGRAM(s) Oral every 6 hours PRN Temp greater or equal to 38C (100.4F), Mild Pain (1 - 3)  cyclobenzaprine 5 milliGRAM(s) Oral three times a day PRN Muscle Spasm  melatonin 3 milliGRAM(s) Oral at bedtime PRN Insomnia  ondansetron Injectable 4 milliGRAM(s) IV Push every 8 hours PRN Nausea and/or Vomiting      Allergies    loratadine (Other; Nausea)  methicillin (Nausea; Rash)    Intolerances        Objective:   Vital Signs Last 24 Hrs  T(C): 36.7 (14 Jun 2022 05:48), Max: 37 (13 Jun 2022 20:57)  T(F): 98 (14 Jun 2022 05:48), Max: 98.6 (13 Jun 2022 20:57)  HR: 83 (14 Jun 2022 05:32) (71 - 107)  BP: 167/78 (14 Jun 2022 05:32) (136/57 - 207/82)  BP(mean): 83 (13 Jun 2022 22:52) (83 - 83)  RR: 18 (14 Jun 2022 05:32) (18 - 18)  SpO2: 99% (14 Jun 2022 05:32) (96% - 100%)    General Exam:   General appearance: No acute distress                 Cardiovascular: Pedal dorsalis pulses intact bilaterally    Neurological Exam:  Mental Status: Orientated to self, date and place.  Attention intact.  No dysarthria, aphasia or neglect.      Cranial Nerves: CN I - not tested.  PERRL, EOMI, VFF, No facial asymmetry.      Other:    06-14    128<L>  |  96  |  10  ----------------------------<  139<H>  4.2   |  23  |  0.72    Ca    9.3      14 Jun 2022 07:35  Phos  3.1     06-13  Mg     2.0     06-13    TPro  7.3  /  Alb  3.3<L>  /  TBili  0.5  /  DBili  x   /  AST  20  /  ALT  30  /  AlkPhos  62  06-14 06-14    128<L>  |  96  |  10  ----------------------------<  139<H>  4.2   |  23  |  0.72    Ca    9.3      14 Jun 2022 07:35  Phos  3.1     06-13  Mg     2.0     06-13    TPro  7.3  /  Alb  3.3<L>  /  TBili  0.5  /  DBili  x   /  AST  20  /  ALT  30  /  AlkPhos  62  06-14    LIVER FUNCTIONS - ( 14 Jun 2022 07:35 )  Alb: 3.3 g/dL / Pro: 7.3 g/dL / ALK PHOS: 62 U/L / ALT: 30 U/L DA / AST: 20 U/L / GGT: x             Radiology             nephro note noted

## 2022-06-14 NOTE — CONSULT NOTE ADULT - SUBJECTIVE AND OBJECTIVE BOX
[facility] NEPHROLOGY- CONSULTATION NOTE    Patient is a 75yo Female with HTN, HLD, Migraines DM type 2, p/w migraines x 2 days. Pt found to have Na 124. Nephrology consulted for hyponatremia    Pt c/o HA. She was recently started on HCTZ 25mg PO daily about 2 weeks ago but only took 1/2 tablet per day.   Pt denies any laxative use. Pt denies any SOB, chest pain, n/v/d, urinary complaints or LE edema      PAST MEDICAL & SURGICAL HISTORY:  High cholesterol      Hypertension      Migraine      Diabetes mellitus      No significant past surgical history        loratadine (Other; Nausea)  methicillin (Nausea; Rash)    Home Medications Reviewed  Hospital Medications:   MEDICATIONS  (STANDING):  aspirin enteric coated 81 milliGRAM(s) Oral daily  atorvastatin 10 milliGRAM(s) Oral at bedtime  enoxaparin Injectable 40 milliGRAM(s) SubCutaneous every 24 hours  hydrALAZINE 25 milliGRAM(s) Oral three times a day  insulin lispro (ADMELOG) corrective regimen sliding scale   SubCutaneous three times a day before meals  insulin lispro (ADMELOG) corrective regimen sliding scale   SubCutaneous at bedtime  losartan 100 milliGRAM(s) Oral daily  pregabalin 25 milliGRAM(s) Oral daily  pregabalin 50 milliGRAM(s) Oral at bedtime    SOCIAL HISTORY:  Denies ETOh, Smoking, or drug use  FAMILY HISTORY:  Family history of MI (myocardial infarction)  two brothers        REVIEW OF SYSTEMS:  Gen: no changes in weight +HA  HEENT: no rhinorrhea  Neck: no sore throat  Cards: no chest pain  Resp: no dyspnea  GI: no nausea or vomiting or diarrhea  : no dysuria or hematuria  Vascular: no LE edema  Derm: no rashes  Neuro: no numbness/tingling  All other review of systems is negative unless indicated above.    VITALS:  T(F): 98 (06-14-22 @ 05:48), Max: 98.6 (06-13-22 @ 20:57)  HR: 83 (06-14-22 @ 05:32)  BP: 167/78 (06-14-22 @ 05:32)  RR: 18 (06-14-22 @ 05:32)  SpO2: 99% (06-14-22 @ 05:32)  Wt(kg): --      PHYSICAL EXAM:  Gen: NAD, calm  HEENT: MMM  Neck: no JVD  Cards: RRR, +S1/S2, no M/G/R  Resp: CTA B/L  GI: soft, NT/ND, NABS  : no CVA tenderness  Extremities: no LE edema B/L  Derm: no rashes  Neuro: non-focal    LABS:  06-14  128 <--, 129 <--, 128 <--, 126 <--, 124 <--  128<L>  |  96  |  10  ----------------------------<  139<H>  4.2   |  23  |  0.72    Ca    9.3      14 Jun 2022 07:35  Phos  3.1     06-13  Mg     2.0     06-13    TPro  7.3  /  Alb  3.3<L>  /  TBili  0.5  /  DBili      /  AST  20  /  ALT  30  /  AlkPhos  62  06-14    Creatinine Trend: 0.72 <--, 0.69 <--, 0.57 <--, 0.67 <--, 0.70 <--                        11.5   8.28  )-----------( 254      ( 14 Jun 2022 07:35 )             32.2     Urine Studies:    Osmolality, Random Urine: 219 mos/kg (06-12 @ 21:26)  Sodium, Random Urine: 60 mmol/L (06-12 @ 21:26)  Creatinine, Random Urine: 18 mg/dL (06-12 @ 21:26)    RADIOLOGY & ADDITIONAL STUDIES:      < from: CT Head No Cont (06.12.22 @ 08:24) >    ACC: [id number] EXAM:  CT BRAIN                          PROCEDURE DATE:  06/12/2022        < end of copied text >  < from: CT Head No Cont (06.12.22 @ 08:24) >  IMPRESSION:  No acute intracranial hemorrhage or mass effect.    --- End of Report ---        < end of copied text >

## 2022-06-15 NOTE — DISCHARGE NOTE PROVIDER - NSDCACTIVITY_GEN_ALL_CORE
D/C instructions provided and explained to pt and pt's spouse, understanding of D/C instructions verbalized including paperwork provided for [facility].  IV removed, catheter intact. Tolerated well. Telemetry monitor removed and returned to monitor room. VSS. Pt denies complaints. Transported off unit via wheelchair.   No restrictions

## 2023-08-18 NOTE — H&P ADULT - NSICDXNOPASTSURGICALHX_GEN_ALL_CORE
Reached out to patient via [employer]. See 8/9/23 E-Advice encounter. Response received from patient with no additional questions.   <-- Click to add NO significant Past Surgical History